# Patient Record
Sex: MALE | Race: WHITE | Employment: FULL TIME | URBAN - METROPOLITAN AREA
[De-identification: names, ages, dates, MRNs, and addresses within clinical notes are randomized per-mention and may not be internally consistent; named-entity substitution may affect disease eponyms.]

---

## 2023-01-18 ENCOUNTER — TELEPHONE (OUTPATIENT)
Dept: PRIMARY CARE CLINIC | Age: 68
End: 2023-01-18

## 2023-01-18 NOTE — TELEPHONE ENCOUNTER
----- Message from Al Brandon sent at 1/17/2023  2:42 PM EST -----  Subject: Message to Provider    QUESTIONS  Information for Provider? Establish care, Pt is requesting new Pt appt   with Dr. Humaira Silva and has been experiencing stomach pain for a   couple weeks. Pt was recently seen by Dr. Frankie Riojas on 1/10/23 and   prefers appts any time except Mondays. Screened green  ---------------------------------------------------------------------------  --------------  Chayito ESQUEDA  [phone]; OK to leave message on voicemail  ---------------------------------------------------------------------------  --------------  SCRIPT ANSWERS  Relationship to Patient? Self  Specialty Confirmation? Primary Care  Do you have pain that has started or worsened within the past 24 hours? No  Are you vomiting blood or have bloody or black stool? No  Have you recently (14 days) seen a provider for this pain?  Yes

## 2023-01-19 ENCOUNTER — OFFICE VISIT (OUTPATIENT)
Dept: FAMILY MEDICINE CLINIC | Age: 68
End: 2023-01-19
Payer: MEDICARE

## 2023-01-19 VITALS
TEMPERATURE: 98.3 F | OXYGEN SATURATION: 97 % | BODY MASS INDEX: 31.4 KG/M2 | HEART RATE: 83 BPM | DIASTOLIC BLOOD PRESSURE: 84 MMHG | HEIGHT: 69 IN | SYSTOLIC BLOOD PRESSURE: 146 MMHG | WEIGHT: 212 LBS

## 2023-01-19 DIAGNOSIS — R10.30 LOWER ABDOMINAL PAIN: Primary | ICD-10-CM

## 2023-01-19 PROCEDURE — 99214 OFFICE O/P EST MOD 30 MIN: CPT | Performed by: PHYSICIAN ASSISTANT

## 2023-01-19 PROCEDURE — 1123F ACP DISCUSS/DSCN MKR DOCD: CPT | Performed by: PHYSICIAN ASSISTANT

## 2023-01-19 RX ORDER — PANTOPRAZOLE SODIUM 40 MG/1
TABLET, DELAYED RELEASE ORAL
COMMUNITY
Start: 2023-01-10

## 2023-01-19 RX ORDER — FEBUXOSTAT 40 MG/1
40 TABLET, FILM COATED ORAL DAILY
COMMUNITY

## 2023-01-19 RX ORDER — DICYCLOMINE HYDROCHLORIDE 10 MG/1
CAPSULE ORAL
COMMUNITY
Start: 2023-01-10

## 2023-01-19 NOTE — PROGRESS NOTES
23  Tiffany Castañeda : 1955 Sex: male  Age 78 y.o. Subjective:  Chief Complaint   Patient presents with    Abdominal Pain     Lower abdominal pain and abnormal bm for the past 3 weeks, taking pantoprazole and dicyclomine from PCP but is not feeling any better         HPI:   Tiffany Castañeda , 78 y.o. male presents to express care for evaluation of lower abdominal pain    HPI  71-year-old male presents to express care for evaluation of lower abdominal pain. The patient's had the symptoms ongoing for about 3 to 4 weeks now. Patient states that he has been seen and evaluated by his PCP. The patient had something similar in the past.  The patient was told that it was related to constipation. The patient is not really having substantial bowel movements. The patient states that he is only having small bowel movements in the morning. The patient is not having any diarrhea. The patient did not have any nausea, vomiting, dysuria, hematuria. He had laboratory studies completed last Wednesday by his PCP that were essentially negative per the patient. They was recommended that he have a CT scan and he is awaiting approval from his insurance company. He actually has a follow-up with gastroenterology tomorrow. ROS:   Unless otherwise stated in this report the patient's positive and negative responses for review of systems for constitutional, eyes, ENT, cardiovascular, respiratory, gastrointestinal, neurological, , musculoskeletal, and integument systems and related systems to the presenting problem are either stated in the history of present illness or were not pertinent or were negative for the symptoms and/or complaints related to the presenting medical problem. Positives and pertinent negatives as per HPI. All others reviewed and are negative.       PMH:     Past Medical History:   Diagnosis Date    Anxiety     GERD (gastroesophageal reflux disease)     Gout     Hyperlipidemia Irritable bowel syndrome        Past Surgical History:   Procedure Laterality Date    TONSILLECTOMY         Family History   Problem Relation Age of Onset    Heart Disease Father         heart valve replaced       Medications:     Current Outpatient Medications:     febuxostat (ULORIC) 40 MG TABS tablet, Take 40 mg by mouth daily, Disp: , Rfl:     dicyclomine (BENTYL) 10 MG capsule, TAKE ONE CAPSULE BY MOUTH THREE TIMES A DAY (Patient not taking: Reported on 1/19/2023), Disp: , Rfl:     pantoprazole (PROTONIX) 40 MG tablet, TAKE ONE TABLET BY MOUTH EVERY DAY, Disp: , Rfl:     HYDROcodone-acetaminophen (NORCO) 5-325 MG per tablet, Take 1 tablet by mouth every 6 hours as needed for Pain. (Patient not taking: Reported on 1/19/2023), Disp: , Rfl:     montelukast (SINGULAIR) 10 MG tablet, , Disp: , Rfl:     Allergies:   No Known Allergies    Social History:     Social History     Tobacco Use    Smoking status: Never    Smokeless tobacco: Never   Substance Use Topics    Alcohol use: No    Drug use: No       Patient lives at home. Physical Exam:     Vitals:    01/19/23 1410   BP: (!) 146/84   Site: Right Upper Arm   Position: Sitting   Pulse: 83   Temp: 98.3 °F (36.8 °C)   TempSrc: Temporal   SpO2: 97%   Weight: 212 lb (96.2 kg)   Height: 5' 9\" (1.753 m)       Exam:  Physical Exam  Nurses note and vital signs reviewed and patient is not hypoxic. General: The patient appears well and in no apparent distress. Patient is resting comfortably on cart. Skin: Warm, dry, no pallor noted. There is no rash noted. Head: Normocephalic, atraumatic. Eye: Normal conjunctiva  Ears, Nose, Mouth, and Throat: Oral mucosa is moist  Cardiovascular: Regular Rate and Rhythm  Respiratory: Patient is in no distress, no accessory muscle use, lungs are clear to auscultation, no wheezing, crackles or rhonchi  Back: Non-tender, no CVA tenderness bilaterally to percussion.   GI: Normal bowel sounds, diffuse lower abdominal tenderness to palpation, no masses appreciated. No rebound, guarding, or rigidity noted. Musculoskeletal: The patient has no evidence of calf tenderness, no pitting edema, symmetrical pulses noted bilaterally  Neurological: A&O x4, normal speech      Testing:           Medical Decision Making:     Vital signs reviewed    Past medical history reviewed. Allergies reviewed. Medications reviewed. Patient on arrival does not appear to be in any apparent distress or discomfort. The patient has been seen and evaluated. The patient does not appear to be toxic or lethargic. The patient will be sent for KUB. We do not have his labs from last Wednesday in the system or in care everywhere. The patient was sent for a KUB that did show evidence of quite a bit of stool noted on the right side of the abdomen. We discussed differential diagnosis. This may be related to constipation. The patient was offered and recommended to have a CT scan. I tried to obtain the CT scan for the patient and he states that he would like to hold off check with his insurance company. He has a follow-up with GI tomorrow. The patient will start taking MiraLAX on a regular basis. Follow-up with GI. The patient is to return to express care or go directly to the emergency department should any of the signs or symptoms worsen. The patient is to followup with primary care physician in 2-3 days for repeat evaluation. The patient has no other questions or concerns at this time the patient will be discharged home. Clinical Impression:   Jacquie Slade was seen today for abdominal pain. Diagnoses and all orders for this visit:    Lower abdominal pain  -     XR ABDOMEN (KUB) (SINGLE AP VIEW); Future      The patient is to call for any concerns or return if any of the signs or symptoms worsen. The patient is to follow-up with PCP in the next 2-3 days for repeat evaluation repeat assessment or go directly to the emergency department. SIGNATURE: Richard Wilkins III, PA-C

## 2023-01-26 ENCOUNTER — OFFICE VISIT (OUTPATIENT)
Dept: FAMILY MEDICINE CLINIC | Age: 68
End: 2023-01-26
Payer: MEDICARE

## 2023-01-26 VITALS
WEIGHT: 212 LBS | SYSTOLIC BLOOD PRESSURE: 130 MMHG | RESPIRATION RATE: 16 BRPM | DIASTOLIC BLOOD PRESSURE: 82 MMHG | HEART RATE: 88 BPM | BODY MASS INDEX: 31.4 KG/M2 | HEIGHT: 69 IN | TEMPERATURE: 97.4 F | OXYGEN SATURATION: 97 %

## 2023-01-26 DIAGNOSIS — K59.00 CONSTIPATION, UNSPECIFIED CONSTIPATION TYPE: ICD-10-CM

## 2023-01-26 DIAGNOSIS — Z00.00 ENCOUNTER FOR MEDICAL EXAMINATION TO ESTABLISH CARE: Primary | ICD-10-CM

## 2023-01-26 PROCEDURE — 99203 OFFICE O/P NEW LOW 30 MIN: CPT | Performed by: FAMILY MEDICINE

## 2023-01-26 PROCEDURE — 1123F ACP DISCUSS/DSCN MKR DOCD: CPT | Performed by: FAMILY MEDICINE

## 2023-01-26 SDOH — ECONOMIC STABILITY: FOOD INSECURITY: WITHIN THE PAST 12 MONTHS, YOU WORRIED THAT YOUR FOOD WOULD RUN OUT BEFORE YOU GOT MONEY TO BUY MORE.: NEVER TRUE

## 2023-01-26 SDOH — ECONOMIC STABILITY: FOOD INSECURITY: WITHIN THE PAST 12 MONTHS, THE FOOD YOU BOUGHT JUST DIDN'T LAST AND YOU DIDN'T HAVE MONEY TO GET MORE.: NEVER TRUE

## 2023-01-26 ASSESSMENT — PATIENT HEALTH QUESTIONNAIRE - PHQ9
SUM OF ALL RESPONSES TO PHQ QUESTIONS 1-9: 0
2. FEELING DOWN, DEPRESSED OR HOPELESS: 0
1. LITTLE INTEREST OR PLEASURE IN DOING THINGS: 0
SUM OF ALL RESPONSES TO PHQ9 QUESTIONS 1 & 2: 0
SUM OF ALL RESPONSES TO PHQ QUESTIONS 1-9: 0

## 2023-01-26 ASSESSMENT — SOCIAL DETERMINANTS OF HEALTH (SDOH): HOW HARD IS IT FOR YOU TO PAY FOR THE VERY BASICS LIKE FOOD, HOUSING, MEDICAL CARE, AND HEATING?: NOT HARD AT ALL

## 2023-01-26 ASSESSMENT — LIFESTYLE VARIABLES
HOW MANY STANDARD DRINKS CONTAINING ALCOHOL DO YOU HAVE ON A TYPICAL DAY: PATIENT DOES NOT DRINK
HOW OFTEN DO YOU HAVE A DRINK CONTAINING ALCOHOL: NEVER

## 2023-01-26 NOTE — PROGRESS NOTES
HPI:  The patient is a 78 y.o. male who presents today to establish care. Previous PCP was Dr. Omaira Grissom at [facility]. Abdominal pain:  Patient is here today with complaints of abdominal pain. This is a/an recent problem. This has been going on for 6 weeks . Exacerbating factors include nothing. Alleviating factors include nothing. Pain is sore and goes into the middle of his back in nature. 5-8/10. Pain is  radiating into his back. Associated signs and symptoms include none. Patient does not have a change in appetite. Patient is  drinking well. Recent travel includes none. Patient does not have blood in stool. Patient has had a colonoscopy with Dr. Kathia Modi at [place] [address] 5 years ago. Patient does not urinary complaints. He states that he has had pain like this in the past.  He states that he was diagnosed with constipation. He states that he had seen [facility]. He went back last week. He states that he was seen by urgent care prior to seeing [address] [place] [address] gastroenterology. He states that he was told to restart amitza which caused diarrhea. He states that he had an x-ray at urgent care. He states that he had a CT scan at [facility] yesterday.         Past Medical History:   Diagnosis Date    Anxiety     GERD (gastroesophageal reflux disease)     Gout     Hyperlipidemia     Irritable bowel syndrome       Past Surgical History:   Procedure Laterality Date    SHOULDER SURGERY Right     shoulder replacment    TONSILLECTOMY        Family History   Problem Relation Age of Onset    Heart Disease Mother     Dementia Mother     Heart Disease Father         heart valve replaced    Kidney Disease Father     No Known Problems Sister     No Known Problems Brother     Bone Cancer Maternal Grandfather      Social History     Socioeconomic History    Marital status:      Spouse name: Not on file    Number of children: Not on file    Years of education: Not on file    Highest education level: Not on file   Occupational History    Not on file   Tobacco Use    Smoking status: Never    Smokeless tobacco: Never   Vaping Use    Vaping Use: Never used   Substance and Sexual Activity    Alcohol use: Yes     Comment: rare    Drug use: No    Sexual activity: Yes     Partners: Female   Other Topics Concern    Not on file   Social History Narrative    Not on file     Social Determinants of Health     Financial Resource Strain: Low Risk     Difficulty of Paying Living Expenses: Not hard at all   Food Insecurity: No Food Insecurity    Worried About Running Out of Food in the Last Year: Never true    Ran Out of Food in the Last Year: Never true   Transportation Needs: Not on file   Physical Activity: Not on file   Stress: Not on file   Social Connections: Not on file   Intimate Partner Violence: Not on file   Housing Stability: Not on file      No Known Allergies     Review of Systems:  Constitutional:  No fever, no fatigue, no chills, no headaches, no weight change  Dermatology:  No rash, no mole, no dry or sensitive skin  ENT:  No cough, no sore throat, no sinus pain, no runny nose, no ear pain  Cardiology:  No chest pain, no palpitations, no leg edema, no shortness of breath, no PND  Endocrinology:  No polydipsia, no polyuria, no cold intolerance, no heat intolerance, no polyphagia, no hair changes  Gastroenterology:  No dysphagia, + abdominal pain, no nausea, no vomiting, + constipation, no diarrhea, no heartburn  Male Reproductive:  No penile discharge, no dysuria  Musculoskeletal:  No joint pain, no leg cramps, no back pain, no muscle aches  Respiratory:  No shortness of breath, no orthopnea, no wheezing, no BEAN, no hemoptysis  Urology:  No blood in the urine, no urinary frequency, no urinary incontinence, no urinary urgency, no nocturia, no dysuria  Neurology:  No numbness/tingling, no dizziness, no weakness  Psychology:  No depression, no sleep disturbances, no suicidal ideation, no anxiety      Vitals:    01/26/23 1427   BP: 130/82   Pulse: 88   Resp: 16   Temp: 97.4 °F (36.3 °C)   TempSrc: Temporal   SpO2: 97%   Weight: 212 lb (96.2 kg)   Height: 5' 9\" (1.753 m)       Physical Exam  Vitals and nursing note reviewed. Constitutional:       Appearance: He is well-developed. HENT:      Head: Normocephalic and atraumatic. Right Ear: External ear normal.      Left Ear: External ear normal.      Nose: Nose normal.   Eyes:      Conjunctiva/sclera: Conjunctivae normal.      Pupils: Pupils are equal, round, and reactive to light. Neck:      Thyroid: No thyromegaly. Cardiovascular:      Rate and Rhythm: Normal rate and regular rhythm. Heart sounds: Normal heart sounds. Pulmonary:      Effort: Pulmonary effort is normal.      Breath sounds: Normal breath sounds. No wheezing. Abdominal:      General: Bowel sounds are decreased. Palpations: Abdomen is soft. Tenderness: There is generalized abdominal tenderness. Musculoskeletal:         General: Normal range of motion. Cervical back: Normal range of motion and neck supple. Skin:     General: Skin is warm and dry. Findings: No rash. Neurological:      Mental Status: He is alert and oriented to person, place, and time. Deep Tendon Reflexes: Reflexes are normal and symmetric. Psychiatric:         Behavior: Behavior normal.       Assessment/Plan:      Reji Garcia was seen today for new patient and abdominal pain. Diagnoses and all orders for this visit:    Encounter for medical examination to establish care    Constipation, unspecified constipation type  Reviewed records from urgent care. Reviewed records from [facility]. Recommend he continue medications as prescribed by [facility]  Will follow up in 2 weeks. If pain continues, will send him to get he cyst evaluated to make sure it is not causing problems. As above.   Call or go to ED immediately if symptoms worsen or persist.  Return in about 2 weeks (around 2/9/2023) for constipation. , or sooner if necessary. Educational materials and/or home exercises printed for patient's review and were included in patient instructions on his/her After Visit Summary and given to patient at the end of visit. Counseled regarding above diagnosis, including possible risks and complications,  especially if left uncontrolled. Counseled regarding the possible side effects, risks, benefits and alternatives to treatment; patient and/or guardian verbalizes understanding, agrees, feels comfortable with and wishes to proceed with above treatment plan. Advised patient to call with any new medication issues, and read all Rx info from pharmacy to assure aware of all possible risks and side effects of medication before taking. Reviewed age and gender appropriate health screening exams and vaccinations. Advised patient regarding importance of keeping up with recommended health maintenance and to schedule as soon as possible if overdue, as this is important in assessing for undiagnosed pathology, especially cancer, as well as protecting against potentially harmful/life threatening disease. Patient and/or guardian verbalizes understanding and agrees with above counseling, assessment and plan. All questions answered. Richard Olvera DO  1/26/2023    I have personally reviewed and updated the chief complaint, HPI, Past Medical, Family and Social History, as well as the above Review of Systems.

## 2023-02-01 ENCOUNTER — TELEPHONE (OUTPATIENT)
Dept: FAMILY MEDICINE CLINIC | Age: 68
End: 2023-02-01

## 2023-02-01 NOTE — TELEPHONE ENCOUNTER
----- Message from Brian Reddy sent at 2/1/2023 11:05 AM EST -----  Subject: Message to Provider    QUESTIONS  Information for Provider? Patient was in last week and saw Jose De Jesus Sotomayor. He was   asked to call back this week to speak to a nurse. He would like a nurse of   the  to give him a call back . ---------------------------------------------------------------------------  --------------  Poncho MCKEON  [phone]; OK to leave message on voicemail  ---------------------------------------------------------------------------  --------------  SCRIPT ANSWERS  Relationship to Patient?  Self

## 2023-02-01 NOTE — TELEPHONE ENCOUNTER
Pt called in stating that he took the amitiza Saturday and and Sunday which did upset his stomach so he stopped taking it. He is wondering if there is something else he should be taking? He is also wondering if you actually saw constipation on the imaging?

## 2023-02-01 NOTE — TELEPHONE ENCOUNTER
He follows with gastroenterology, I would recommend calling their office if having side effects from their medication. And yes it was seen on his imaging.

## 2023-02-06 ENCOUNTER — OFFICE VISIT (OUTPATIENT)
Dept: FAMILY MEDICINE CLINIC | Age: 68
End: 2023-02-06
Payer: MEDICARE

## 2023-02-06 VITALS
HEART RATE: 62 BPM | DIASTOLIC BLOOD PRESSURE: 80 MMHG | SYSTOLIC BLOOD PRESSURE: 128 MMHG | WEIGHT: 214 LBS | HEIGHT: 69 IN | OXYGEN SATURATION: 97 % | TEMPERATURE: 98.4 F | BODY MASS INDEX: 31.7 KG/M2

## 2023-02-06 DIAGNOSIS — K59.00 CONSTIPATION, UNSPECIFIED CONSTIPATION TYPE: Primary | ICD-10-CM

## 2023-02-06 PROCEDURE — 1123F ACP DISCUSS/DSCN MKR DOCD: CPT | Performed by: PHYSICIAN ASSISTANT

## 2023-02-06 PROCEDURE — 99214 OFFICE O/P EST MOD 30 MIN: CPT | Performed by: PHYSICIAN ASSISTANT

## 2023-02-06 RX ORDER — CHLORDIAZEPOXIDE HYDROCHLORIDE AND CLIDINIUM BROMIDE 5; 2.5 MG/1; MG/1
CAPSULE ORAL
COMMUNITY
Start: 2023-01-31

## 2023-02-06 RX ORDER — HYOSCYAMINE SULFATE 0.125 MG
125 TABLET ORAL EVERY 4 HOURS PRN
Qty: 20 TABLET | Refills: 0 | Status: SHIPPED | OUTPATIENT
Start: 2023-02-06

## 2023-02-06 NOTE — PROGRESS NOTES
23  Sheppard Kocher : 1955 Sex: male  Age 78 y.o. Subjective:  Chief Complaint   Patient presents with    Constipation     Was seen 23 by . Had a CAT scan nothing on scan but is still having cramps. Pt wants another x-ray. HPI:   Sheppard Kocher , 78 y.o. male presents to express care for evaluation of constipation    HPI  55-year-old male presents to express care for evaluation of constipation, lower abdominal pain. The patient has had the symptoms ongoing for last couple of months. The patient was seen and evaluated on 2023. The patient had x-ray performed at that time that did show quite a bit of stool. The patient also then had a CT scan. He did not want CT scan at the time of the visit on the . The patient actually followed up with PCP and establish with new PCP on 2023. The patient was treated at the beginning of January with Bentyl and pantoprazole. The patient was then given Librax by PCP. The patient states that he took 1 dose of the medication does not seem to be helping. He notes that he is having some lower crampy abdominal pain and occasional back pain associated. ROS:   Unless otherwise stated in this report the patient's positive and negative responses for review of systems for constitutional, eyes, ENT, cardiovascular, respiratory, gastrointestinal, neurological, , musculoskeletal, and integument systems and related systems to the presenting problem are either stated in the history of present illness or were not pertinent or were negative for the symptoms and/or complaints related to the presenting medical problem. Positives and pertinent negatives as per HPI. All others reviewed and are negative.       PMH:     Past Medical History:   Diagnosis Date    Anxiety     GERD (gastroesophageal reflux disease)     Gout     Hyperlipidemia     Irritable bowel syndrome        Past Surgical History:   Procedure Laterality Date    SHOULDER SURGERY Right     shoulder replacment    TONSILLECTOMY         Family History   Problem Relation Age of Onset    Heart Disease Mother     Dementia Mother     Heart Disease Father         heart valve replaced    Kidney Disease Father     No Known Problems Sister     No Known Problems Brother     Bone Cancer Maternal Grandfather        Medications:     Current Outpatient Medications:     chlordiazePOXIDE-clidinium (LIBRAX) 5-2.5 MG per capsule, TAKE ONE CAPSULE BY MOUTH TWO TIMES A DAY, Disp: , Rfl:     hyoscyamine (LEVSIN) 125 MCG tablet, Take 1 tablet by mouth every 4 hours as needed for Cramping, Disp: 20 tablet, Rfl: 0    febuxostat (ULORIC) 40 MG TABS tablet, Take 40 mg by mouth daily, Disp: , Rfl:     Allergies:   No Known Allergies    Social History:     Social History     Tobacco Use    Smoking status: Never    Smokeless tobacco: Never   Vaping Use    Vaping Use: Never used   Substance Use Topics    Alcohol use: Yes     Comment: rare    Drug use: No       Patient lives at home. Physical Exam:     Vitals:    02/06/23 1410   BP: 128/80   Pulse: 62   Temp: 98.4 °F (36.9 °C)   SpO2: 97%   Weight: 214 lb (97.1 kg)   Height: 5' 9\" (1.753 m)       Exam:  Physical Exam  Nurses note and vital signs reviewed and patient is not hypoxic. General: The patient appears well and in no apparent distress. Patient is resting comfortably on cart. Skin: Warm, dry, no pallor noted. There is no rash noted. Head: Normocephalic, atraumatic. Eye: Normal conjunctiva  Ears, Nose, Mouth, and Throat: Oral mucosa is moist  Cardiovascular: Regular Rate and Rhythm  Respiratory: Patient is in no distress, no accessory muscle use, lungs are clear to auscultation, no wheezing, crackles or rhonchi  Back: Non-tender, no CVA tenderness bilaterally to percussion. GI: Normal bowel sounds, soft, diffuse tenderness to palpation, no masses appreciated. No rebound, guarding, or rigidity noted.   Musculoskeletal: The patient has no evidence of calf tenderness, no pitting edema, symmetrical pulses noted bilaterally  Neurological: A&O x4, normal speech        Testing:     Provider, Dwayne Ghulam Bessa 144 - 01/26/2023    Formatting of this note might be different from the original.  * * *Final Report* * *    DATE OF EXAM: Jan 25 2023 3:25PM     [facility] 0530 - CT ABD/PEL Lucian Steven / ACCESSION # [id number]    PROCEDURE REASON: GASTRITIS    * * * * Physician Interpretation * * * *    RESULT: EXAMINATION: CT ABDOMEN AND PELVIS WITH IV CONTRAST    CLINICAL HISTORY: Right-sided abdominal pain, gastritis. TECHNIQUE: CT of the abdomen and pelvis was performed using standard   technique, scanning from just above the dome of the diaphragm to the   symphysis pubis. MQ: CTAP_3    Contrast:  IV: 100 ml of Omnipaque 300  Oral: 400 ml of Omni 300 10-25ml diluted with water    CT Radiation dose: Integrated Dose-length product (DLP) for this visit =   795.69 mGy*cm. CT Dose Reduction Employed: Automated exposure control (AEC)    COMPARISON: None. RESULT:    Liver: No mass. Biliary Tract: No bile duct dilation. The gallbladder appears   unremarkable. Spleen: No splenomegaly. No mass. Pancreas: No mass or ductal dilatation. Adrenal glands: No mass. Kidneys: No enhancing mass or hydronephrosis. GI Tract: No bowel wall thickening or dilation. Sigmoid diverticulosis,   without CT evidence for acute diverticulitis. A nondilated appendix is   visualized. Lymph nodes: No substantial mesenteric, retroperitoneal, or pelvic   lymphadenopathy. Mesentery: No ascites. No mass. Retroperitoneum: No mass. Vasculature:  - Abdominal aorta and iliac arteries: Atherosclerotic calcifications   without aneurysm. - Celiac and SMA: Patent. - Portal venous system (SMV, splenic vein, portal vein and branches):   Patent.  - Hepatic veins: Patent. Pelvis: No free fluid.  Indeterminate, 7.4 x 6.3 cm cystic lesion is   appreciated within the ventral lower pelvis, causing mass effect upon the   right lateral margin of the urinary bladder. Punctate areas of cyst wall   calcification are noted. Differential considerations include iatrogenic   etiologies, mesenteric/duplication cyst or other benign cystic etiology. Clinical correlation is recommended. Bones/Soft Tissues:  Prominence to the trabecular markings within the proximal left femur may   relate to pagetoid changes. Mild degenerative change within the lumbar   spine. No osseous destructive process. Lower Thorax: Limited images through the lung bases appear essentially   unremarkable. Subsegmental bibasilar atelectasis/scarring is noted.  (topogram) images: No additional findings. IMPRESSION  IMPRESSION:      1. Indeterminate, 7.4 cm cystic lesion within the ventral aspect of the   lower right pelvis, effacing the right lateral margin of the urinary   bladder. Differential considerations include iatrogenic etiologies,   mesentery/duplication cyst or other benign cystic etiology. 2. Sigmoid diverticulosis, without CT evidence for acute diverticulitis   or appendicitis. Transcribe Date/Time: Jan 25 2023 4:18P    Dictated by: Azeem Paez MD    This examination was interpreted and the report reviewed and   electronically signed by:   Azeem Paez MD on Jan 26 2023 9:51AM EST        Thank you for allowing us to participate in the care of your patient. Should there be any questions regarding this interpretation, please call   [phone]. If you are unable to reach us at the number above,  please feel free to contact [facility] at [phone]. Medical Decision Making:     Vital signs reviewed    Past medical history reviewed. Allergies reviewed. Medications reviewed. Patient on arrival does not appear to be in any apparent distress or discomfort. The patient has been seen and evaluated. The patient does not appear to be toxic or lethargic.      The patient had x-rays reviewed and had CT reviewed. There is a indeterminant 7.4 cm cystic lesion within the ventral aspect of the lower right pelvis, effacing the right lateral margin of the urinary bladder. Differential considerations include iatrogenic etiologies, mesentery/duplication cyst or other benign cystic etiology. There is sigmoid diverticulosis without CT evidence of acute diverticulitis or appendicitis. The patient was inquiring about getting another x-ray. Unfortunately we do not have x-ray available today here in the office and the patient will be sent for imaging. I did speak with the pipe [address] and the patient was given a prescription for the Librax and he was also given Bentyl and pantoprazole at the beginning of the month of January. X-ray did not show any evidence of acute obstruction. 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 The patient was updated with the results. The patient is having quite a bit of crampy pain. The patient will continue with the amitizia. The patient will be given Levsin. The patient is to push fluids. Increase the fibers. Close follow-up with PCP. The patient is to return to express care or go directly to the emergency department should any of the signs or symptoms worsen. The patient is to followup with primary care physician in 2-3 days for repeat evaluation. The patient has no other questions or concerns at this time the patient will be discharged home. Clinical Impression:   Miryam Joyner was seen today for constipation. Diagnoses and all orders for this visit:    Constipation, unspecified constipation type  -     XR ABDOMEN (KUB) (SINGLE AP VIEW); Future    Other orders  -     hyoscyamine (LEVSIN) 125 MCG tablet; Take 1 tablet by mouth every 4 hours as needed for Cramping      The patient is to call for any concerns or return if any of the signs or symptoms worsen.  The patient is to follow-up with PCP in the next 2-3 days for repeat evaluation repeat assessment or go directly to the emergency department.      SIGNATURE: Meir Khan III, PA-C

## 2023-02-13 ENCOUNTER — HOSPITAL ENCOUNTER (EMERGENCY)
Age: 68
Discharge: HOME | End: 2023-02-13
Attending: EMERGENCY MEDICINE
Payer: MEDICARE

## 2023-02-13 ENCOUNTER — APPOINTMENT (OUTPATIENT)
Dept: CT IMAGING | Age: 68
End: 2023-02-13
Payer: MEDICARE

## 2023-02-13 VITALS
SYSTOLIC BLOOD PRESSURE: 156 MMHG | OXYGEN SATURATION: 99 % | RESPIRATION RATE: 16 BRPM | WEIGHT: 210 LBS | DIASTOLIC BLOOD PRESSURE: 97 MMHG | TEMPERATURE: 98.6 F | BODY MASS INDEX: 31.1 KG/M2 | HEIGHT: 69 IN | HEART RATE: 75 BPM

## 2023-02-13 DIAGNOSIS — K59.00 CONSTIPATION, UNSPECIFIED CONSTIPATION TYPE: ICD-10-CM

## 2023-02-13 DIAGNOSIS — R19.00 PELVIC MASS IN MALE: Primary | ICD-10-CM

## 2023-02-13 LAB
ALBUMIN SERPL-MCNC: 4.1 G/DL (ref 3.5–5.2)
ALP BLD-CCNC: 86 U/L (ref 40–129)
ALT SERPL-CCNC: 13 U/L (ref 0–40)
ANION GAP SERPL CALCULATED.3IONS-SCNC: 7 MMOL/L (ref 7–16)
AST SERPL-CCNC: 13 U/L (ref 0–39)
BACTERIA: NORMAL /HPF
BASOPHILS ABSOLUTE: 0.04 E9/L (ref 0–0.2)
BASOPHILS RELATIVE PERCENT: 0.6 % (ref 0–2)
BILIRUB SERPL-MCNC: 0.3 MG/DL (ref 0–1.2)
BILIRUBIN URINE: NEGATIVE
BLOOD, URINE: NEGATIVE
BUN BLDV-MCNC: 12 MG/DL (ref 6–23)
CALCIUM SERPL-MCNC: 8.9 MG/DL (ref 8.6–10.2)
CHLORIDE BLD-SCNC: 102 MMOL/L (ref 98–107)
CLARITY: CLEAR
CO2: 29 MMOL/L (ref 22–29)
COLOR: YELLOW
CREAT SERPL-MCNC: 1 MG/DL (ref 0.7–1.2)
EOSINOPHILS ABSOLUTE: 0.05 E9/L (ref 0.05–0.5)
EOSINOPHILS RELATIVE PERCENT: 0.8 % (ref 0–6)
GFR SERPL CREATININE-BSD FRML MDRD: >60 ML/MIN/1.73
GLUCOSE BLD-MCNC: 87 MG/DL (ref 74–99)
GLUCOSE URINE: NEGATIVE MG/DL
HCT VFR BLD CALC: 45.9 % (ref 37–54)
HEMOGLOBIN: 15.8 G/DL (ref 12.5–16.5)
IMMATURE GRANULOCYTES #: 0.01 E9/L
IMMATURE GRANULOCYTES %: 0.2 % (ref 0–5)
KETONES, URINE: NEGATIVE MG/DL
LACTIC ACID, SEPSIS: 1.2 MMOL/L (ref 0.5–1.9)
LEUKOCYTE ESTERASE, URINE: ABNORMAL
LIPASE: 29 U/L (ref 13–60)
LYMPHOCYTES ABSOLUTE: 1.7 E9/L (ref 1.5–4)
LYMPHOCYTES RELATIVE PERCENT: 25.8 % (ref 20–42)
MCH RBC QN AUTO: 30.7 PG (ref 26–35)
MCHC RBC AUTO-ENTMCNC: 34.4 % (ref 32–34.5)
MCV RBC AUTO: 89.3 FL (ref 80–99.9)
MONOCYTES ABSOLUTE: 0.52 E9/L (ref 0.1–0.95)
MONOCYTES RELATIVE PERCENT: 7.9 % (ref 2–12)
NEUTROPHILS ABSOLUTE: 4.27 E9/L (ref 1.8–7.3)
NEUTROPHILS RELATIVE PERCENT: 64.7 % (ref 43–80)
NITRITE, URINE: NEGATIVE
PDW BLD-RTO: 12.6 FL (ref 11.5–15)
PH UA: 8 (ref 5–9)
PLATELET # BLD: 248 E9/L (ref 130–450)
PMV BLD AUTO: 9.7 FL (ref 7–12)
POTASSIUM SERPL-SCNC: 4.1 MMOL/L (ref 3.5–5)
PROTEIN UA: NEGATIVE MG/DL
RBC # BLD: 5.14 E12/L (ref 3.8–5.8)
RBC UA: NORMAL /HPF (ref 0–2)
SODIUM BLD-SCNC: 138 MMOL/L (ref 132–146)
SPECIFIC GRAVITY UA: 1.01 (ref 1–1.03)
TOTAL PROTEIN: 6.7 G/DL (ref 6.4–8.3)
UROBILINOGEN, URINE: 0.2 E.U./DL
WBC # BLD: 6.6 E9/L (ref 4.5–11.5)
WBC UA: NORMAL /HPF (ref 0–5)

## 2023-02-13 PROCEDURE — 81001 URINALYSIS AUTO W/SCOPE: CPT

## 2023-02-13 PROCEDURE — 6360000004 HC RX CONTRAST MEDICATION: Performed by: RADIOLOGY

## 2023-02-13 PROCEDURE — 36415 COLL VENOUS BLD VENIPUNCTURE: CPT

## 2023-02-13 PROCEDURE — 74177 CT ABD & PELVIS W/CONTRAST: CPT

## 2023-02-13 PROCEDURE — 83690 ASSAY OF LIPASE: CPT

## 2023-02-13 PROCEDURE — 83605 ASSAY OF LACTIC ACID: CPT

## 2023-02-13 PROCEDURE — 80053 COMPREHEN METABOLIC PANEL: CPT

## 2023-02-13 PROCEDURE — 85025 COMPLETE CBC W/AUTO DIFF WBC: CPT

## 2023-02-13 PROCEDURE — 99285 EMERGENCY DEPT VISIT HI MDM: CPT

## 2023-02-13 RX ORDER — DICYCLOMINE HYDROCHLORIDE 10 MG/1
10 CAPSULE ORAL 4 TIMES DAILY
Qty: 12 CAPSULE | Refills: 0 | Status: SHIPPED | OUTPATIENT
Start: 2023-02-13 | End: 2023-02-16

## 2023-02-13 RX ADMIN — IOPAMIDOL 75 ML: 755 INJECTION, SOLUTION INTRAVENOUS at 17:00

## 2023-02-13 ASSESSMENT — PAIN SCALES - GENERAL: PAINLEVEL_OUTOF10: 8

## 2023-02-13 ASSESSMENT — PAIN - FUNCTIONAL ASSESSMENT: PAIN_FUNCTIONAL_ASSESSMENT: 0-10

## 2023-02-13 ASSESSMENT — PAIN DESCRIPTION - LOCATION: LOCATION: ABDOMEN

## 2023-02-13 NOTE — ED PROVIDER NOTES
[address]        Pt Name: Estephanie Jalloh  MRN: [id number]  [place] 1955  Date of evaluation: 2/13/2023  Provider: Yoly Dwyer MD  PCP: Elver Connor DO  Note Started: 3:31 PM EST 2/13/23    CHIEF COMPLAINT       Chief Complaint   Patient presents with    Flank Pain     Right flank pain    Abdominal Pain     RLQ pain since December. Seen at urgent cares and PCP for same       HISTORY OF PRESENT ILLNESS: 1 or more Elements   History From: Patient    Limitations to history : None    Estephanie Jalloh is a 78 y.o. male who presents with 2 months daily intermittent cramping pain to the lower abdomen diffusely. He states occasionally has pain to the right flank but mostly just diffuse cramping pain. He had a CT scan that showed constipation is a x-ray that showed a possible stone. He has follow-up scheduled this week with urology, Dr. Joaquin Richmond, is scheduled for an outpatient cystoscopy. He seen GI Dr. Gucci Jeff and. They are recommending an outpatient colonoscopy. He has been started on stool softeners and he is having bowel movements his last bowel movement was this morning. He is no abdominal pain at this time. Just frustrated he does not have a \"true diagnosis\". He is not any fevers or chills and no nausea or vomiting. Denies any blood in his stool. Denies any gross hematuria. Denies any numbness or weakness to the extremities no radicular symptoms. No chest pain or shortness of breath. No change in symptoms today. Does have documented history of anxiety as well as IBS. Nursing Notes were all reviewed and agreed with or any disagreements were addressed in the HPI. REVIEW OF EXTERNAL NOTE :       I reviewed outpatient family medicine encounter with Dr. Bereket Small on 1/26/2023 with same complaints of abdominal pain. Recommended outpatient follow-up with [facility] gastroenterology.     REVIEW OF SYSTEMS :           Positives and Pertinent negatives as per HPI. SURGICAL HISTORY     Past Surgical History:   Procedure Laterality Date    SHOULDER SURGERY Right     shoulder replacment    TONSILLECTOMY         CURRENTMEDICATIONS       Previous Medications    FEBUXOSTAT (ULORIC) 40 MG TABS TABLET    Take 40 mg by mouth daily       ALLERGIES     Patient has no known allergies. FAMILYHISTORY       Family History   Problem Relation Age of Onset    Heart Disease Mother     Dementia Mother     Heart Disease Father         heart valve replaced    Kidney Disease Father     No Known Problems Sister     No Known Problems Brother     Bone Cancer Maternal Grandfather         SOCIAL HISTORY       Social History     Tobacco Use    Smoking status: Never    Smokeless tobacco: Never   Vaping Use    Vaping Use: Never used   Substance Use Topics    Alcohol use: Yes     Comment: rare    Drug use: No       SCREENINGS        Sam Coma Scale  Eye Opening: Spontaneous  Best Verbal Response: Oriented  Best Motor Response: Obeys commands  Sam Coma Scale Score: 15                CIWA Assessment  BP: (!) 156/97  Heart Rate: 75           PHYSICAL EXAM  1 or more Elements     ED Triage Vitals [02/13/23 1456]   BP Temp Temp src Heart Rate Resp SpO2 Height Weight   (!) 189/120 98.6 °F (37 °C) -- 70 14 98 % 5' 9\" (1.753 m) 210 lb (95.3 kg)                Constitutional/General: Alert and oriented x3; overweight  Head: Normocephalic and atraumatic  Eyes: PERRL, EOMI, conjunctiva normal, sclera non icteric  ENT:  Oropharynx clear, handling secretions, no trismus, no asymmetry of the posterior oropharynx or uvular edema  Neck: Supple, full ROM, no stridor, no meningeal signs  Respiratory: Lungs clear to auscultation bilaterally, no wheezes, rales, or rhonchi. Not in respiratory distress  Cardiovascular:  Regular rate. Regular rhythm. No murmurs, no gallops, no rubs. 2+ distal pulses. Equal extremity pulses.    Chest: No chest wall tenderness  GI:  Abdomen Soft, Non tender, Non distended.  No rebound, guarding, or rigidity. No pulsatile masses.  No Edwards sign.  No McBurney's point tenderness no CVA tenderness bilaterally  Musculoskeletal: Moves all extremities x 4. Warm and well perfused, no clubbing, no cyanosis, no edema. Capillary refill <3 seconds  Integument: skin warm and dry. No rashes.   Neurologic: GCS 15, no focal deficits, symmetric strength 5/5 in the upper and lower extremities bilaterally  Psychiatric: Normal Affect            DIAGNOSTIC RESULTS   LABS:      I have personally reviewed and interpreted all laboratory and imaging results for this patient. Results are listed below.     LABS:  Results for orders placed or performed during the hospital encounter of 02/13/23   Comprehensive Metabolic Panel   Result Value Ref Range    Sodium 138 132 - 146 mmol/L    Potassium 4.1 3.5 - 5.0 mmol/L    Chloride 102 98 - 107 mmol/L    CO2 29 22 - 29 mmol/L    Anion Gap 7 7 - 16 mmol/L    Glucose 87 74 - 99 mg/dL    BUN 12 6 - 23 mg/dL    Creatinine 1.0 0.7 - 1.2 mg/dL    Est, Glom Filt Rate >60 >=60 mL/min/1.73    Calcium 8.9 8.6 - 10.2 mg/dL    Total Protein 6.7 6.4 - 8.3 g/dL    Albumin 4.1 3.5 - 5.2 g/dL    Total Bilirubin 0.3 0.0 - 1.2 mg/dL    Alkaline Phosphatase 86 40 - 129 U/L    ALT 13 0 - 40 U/L    AST 13 0 - 39 U/L   CBC with Auto Differential   Result Value Ref Range    WBC 6.6 4.5 - 11.5 E9/L    RBC 5.14 3.80 - 5.80 E12/L    Hemoglobin 15.8 12.5 - 16.5 g/dL    Hematocrit 45.9 37.0 - 54.0 %    MCV 89.3 80.0 - 99.9 fL    MCH 30.7 26.0 - 35.0 pg    MCHC 34.4 32.0 - 34.5 %    RDW 12.6 11.5 - 15.0 fL    Platelets 248 130 - 450 E9/L    MPV 9.7 7.0 - 12.0 fL    Neutrophils % 64.7 43.0 - 80.0 %    Immature Granulocytes % 0.2 0.0 - 5.0 %    Lymphocytes % 25.8 20.0 - 42.0 %    Monocytes % 7.9 2.0 - 12.0 %    Eosinophils % 0.8 0.0 - 6.0 %    Basophils % 0.6 0.0 - 2.0 %    Neutrophils Absolute 4.27 1.80 - 7.30 E9/L    Immature Granulocytes  # 0.01 E9/L    Lymphocytes Absolute 1.70 1.50 - 4.00 E9/L    Monocytes Absolute 0.52 0.10 - 0.95 E9/L    Eosinophils Absolute 0.05 0.05 - 0.50 E9/L    Basophils Absolute 0.04 0.00 - 0.20 E9/L   Lipase   Result Value Ref Range    Lipase 29 13 - 60 U/L   Urinalysis   Result Value Ref Range    Color, UA Yellow Straw/Yellow    Clarity, UA Clear Clear    Glucose, Ur Negative Negative mg/dL    Bilirubin Urine Negative Negative    Ketones, Urine Negative Negative mg/dL    Specific Gravity, UA 1.015 1.005 - 1.030    Blood, Urine Negative Negative    pH, UA 8.0 5.0 - 9.0    Protein, UA Negative Negative mg/dL    Urobilinogen, Urine 0.2 <2.0 E.U./dL    Nitrite, Urine Negative Negative    Leukocyte Esterase, Urine TRACE (A) Negative   Lactate, Sepsis   Result Value Ref Range    Lactic Acid, Sepsis 1.2 0.5 - 1.9 mmol/L   Microscopic Urinalysis   Result Value Ref Range    WBC, UA NONE 0 - 5 /HPF    RBC, UA NONE 0 - 2 /HPF    Bacteria, UA NONE SEEN None Seen /HPF             RADIOLOGY:   Non-plain film images such as CT, Ultrasound and MRI are read by the radiologist. Plain radiographic images are visualized and preliminarily interpreted by the ED Provider with the below findings:        Interpretation per the Radiologist below, if available at the time of this note:    CT ABDOMEN PELVIS W IV CONTRAST Additional Contrast? None   Final Result   6.9 x 6.0 cm cystic mass with punctate peripheral calcification in the right   pelvis mild to rectally abutting against the right side of the urinary   bladder. Differential diagnostic considerations include large bladder   diverticulum or other cystic pelvic masses. Correlation with bladder   sonogram recommended. Diverticulosis with no evidence of diverticulitis. Mild constipation. No results found. No results found.     PROCEDURES   Unless otherwise noted below, none          CRITICAL CARE TIME (.cct)   none    PAST MEDICAL HISTORY/Chronic Conditions Affecting Care      has a past medical history of Anxiety, GERD (gastroesophageal reflux disease), Gout, Hyperlipidemia, and Irritable bowel syndrome. EMERGENCY DEPARTMENT COURSE    Vitals:    Vitals:    02/13/23 1456 02/13/23 1733   BP: (!) 189/120 (!) 156/97   Pulse: 70 75   Resp: 14 16   Temp: 98.6 °F (37 °C)    SpO2: 98% 99%   Weight: 210 lb (95.3 kg)    Height: 5' 9\" (1.753 m)        Patient was given the following medications:  Medications   iopamidol (ISOVUE-370) 76 % injection 75 mL (75 mLs IntraVENous Given 2/13/23 1700)           Is this patient to be included in the SEP-1 Core Measure due to severe sepsis or septic shock? No Exclusion criteria - the patient is NOT to be included for SEP-1 Core Measure due to: Infection is not suspected        Medical Decision Making/Differential Diagnosis:    CC/HPI Summary, Social Determinants of health, Records Reviewed, DDx, testing done/not done, ED Course, Reassessment, disposition considerations/shared decision making with patient, consults, disposition:          Medical Decision Making  Amount and/or Complexity of Data Reviewed  Labs: ordered. Radiology: ordered. Risk  Prescription drug management. Ira Page is a 78 y.o. male who presents to the ED for daily cramping diffuse lower abdominal pain for the last 2 months. He has seen his outpatient PCP he has seen GI. They recommended colonoscopy he has an outpatient follow-up with urology and they are recommending cystoscopy. He is started new medications for constipation which he has had issues with the past with a history of IBS. No new changes today actually no abdominal pain at all in the emergency room today no urinary complaints no fever no chills last bowel was this morning. No blood in his stool. She will have lab work and repeat CT imaging as last CT was 1 month ago per patient.   Vital signs upon arrival BP (!) 156/97   Pulse 75   Temp 98.6 °F (37 °C)   Resp 16   Ht 5' 9\" (1.753 m) Wt 210 lb (95.3 kg)   SpO2 99%   BMI 31.01 kg/m²       Chronic conditions:  has a past medical history of Anxiety, GERD (gastroesophageal reflux disease), Gout, Hyperlipidemia, and Irritable bowel syndrome. Differential diagnosis includes but not limited to constipation IBS kidney stone dehydration UTI          ED Course Summary:(labs and imaging reviewed, interventions, reassessment, consults,shared decision making with patient, disposition)    No abdominal pain on initial exam.  Did not want any pain medication. CBC was normal white count was 6.6 hemoglobin stable at 15.8 chemistry was normal creatinine was normal no signs of dehydration. LFTs were normal, as well lipase was normal urinalysis was negative for acute infection no blood. Lactic acid was normal.  CT abdomen pelvis did not show any sign of stone. No diverticulitis. There is a 6.9 x 6 cm cystic mass on the right side of the pelvis differential includes diverticular cystic pelvic mass. I did review prior outpatient imaging that confirms similar mass. He is aware of this finding and need for close follow-up with urology. He excised follow-up with appointment this week. Patient does have mild constipation. Patient be given Bentyl for intermittent abdominal cramping he was advised to start taking MiraLAX, as well. Repeat abdominal exam was soft and nontender. No acute pathology found. He has follow-up outpatient with PCP as well as GI as well as urology. We discussed the need for close follow-up and to return for any worsening symptoms he is comfortable this plan and stable for discharge. ED Course as of 02/13/23 1836 Mon Feb 13, 2023   7428 Reviewed prior CT abdomen and pelvis result from 2/26/2016 that showed a bladder diverticulum on that CT. [KK]   1829 I reviewed CT from [place] [facility] from 1/25/2023 that showed a 7.4 cm right-sided lower pelvic mass possibly diverticulum versus benign cystic lesion.   This is similar to what is seen on CT today. [OQ]   5334 States is currently taking stool softeners for constipation but occasionally has abdominal cramping related to this. I will start the patient on Bentyl. He has outpatient follow-up with urology as well as GI scheduled for this coming week. Jeffrey abdominal exam soft and nontender before discharge. [OG]   8485 Patient is well aware of abnormal CT findings and known pelvic cystic mass. He has follow-up with urology regarding that this week. [KK]   1830 Patient was not taking Levsin at home he states it did not work for him he wants to try the Bentyl. He will follow-up with the GI doctor, as well Preston King      ED Course User Index  [KK] Bereket York MD          CONSULTS: (Who and What was discussed)  None        I am the Primary Clinician of Record. FINAL IMPRESSION      1. Pelvic mass in male Stable   2.  Constipation, unspecified constipation type          DISPOSITION/PLAN     DISPOSITION Decision To Discharge 02/13/2023 06:30:04 PM      PATIENT REFERRED TO:  Corie Vanegas DO  [address] [facility],[phone]    Call in 2 days  for follow-up appointment    Alf Frias MD  [address] [phone]    Call in 3 days  for follow-up appointment    [address] [place] [address] [phone]    Call in 3 days  for follow-up appointment    DISCHARGE MEDICATIONS:  New Prescriptions    DICYCLOMINE (BENTYL) 10 MG CAPSULE    Take 1 capsule by mouth 4 times daily for 3 days       DISCONTINUED MEDICATIONS:  Discontinued Medications    CHLORDIAZEPOXIDE-CLIDINIUM (LIBRAX) 5-2.5 MG PER CAPSULE    TAKE ONE CAPSULE BY MOUTH TWO TIMES A DAY    HYOSCYAMINE (LEVSIN) 125 MCG TABLET    Take 1 tablet by mouth every 4 hours as needed for Cramping              (Please note that portions of this note were completed with a voice recognition program.  Efforts were made to edit the dictations but occasionally words are mis-transcribed.)    Any Dwyer MD (electronically signed)            Chan Mays MD  02/15/23 7192

## 2023-02-24 ENCOUNTER — OFFICE VISIT (OUTPATIENT)
Dept: PRIMARY CARE CLINIC | Age: 68
End: 2023-02-24
Payer: MEDICARE

## 2023-02-24 VITALS
TEMPERATURE: 97.4 F | HEART RATE: 84 BPM | OXYGEN SATURATION: 98 % | SYSTOLIC BLOOD PRESSURE: 140 MMHG | WEIGHT: 214 LBS | HEIGHT: 69 IN | DIASTOLIC BLOOD PRESSURE: 88 MMHG | BODY MASS INDEX: 31.7 KG/M2

## 2023-02-24 DIAGNOSIS — I10 PRIMARY HYPERTENSION: ICD-10-CM

## 2023-02-24 DIAGNOSIS — F41.9 ANXIETY: ICD-10-CM

## 2023-02-24 DIAGNOSIS — R10.30 LOWER ABDOMINAL PAIN: Primary | ICD-10-CM

## 2023-02-24 PROCEDURE — 99203 OFFICE O/P NEW LOW 30 MIN: CPT | Performed by: FAMILY MEDICINE

## 2023-02-24 PROCEDURE — 3079F DIAST BP 80-89 MM HG: CPT | Performed by: FAMILY MEDICINE

## 2023-02-24 PROCEDURE — 3077F SYST BP >= 140 MM HG: CPT | Performed by: FAMILY MEDICINE

## 2023-02-24 PROCEDURE — 1123F ACP DISCUSS/DSCN MKR DOCD: CPT | Performed by: FAMILY MEDICINE

## 2023-02-24 RX ORDER — TIZANIDINE 4 MG/1
4 TABLET ORAL 3 TIMES DAILY
Qty: 90 TABLET | Refills: 5 | Status: SHIPPED | OUTPATIENT
Start: 2023-02-24

## 2023-02-24 RX ORDER — LISINOPRIL 10 MG/1
10 TABLET ORAL DAILY
Qty: 30 TABLET | Refills: 5 | Status: SHIPPED | OUTPATIENT
Start: 2023-02-24

## 2023-02-24 SDOH — ECONOMIC STABILITY: INCOME INSECURITY: HOW HARD IS IT FOR YOU TO PAY FOR THE VERY BASICS LIKE FOOD, HOUSING, MEDICAL CARE, AND HEATING?: NOT HARD AT ALL

## 2023-02-24 SDOH — ECONOMIC STABILITY: FOOD INSECURITY: WITHIN THE PAST 12 MONTHS, YOU WORRIED THAT YOUR FOOD WOULD RUN OUT BEFORE YOU GOT MONEY TO BUY MORE.: NEVER TRUE

## 2023-02-24 SDOH — ECONOMIC STABILITY: HOUSING INSECURITY
IN THE LAST 12 MONTHS, WAS THERE A TIME WHEN YOU DID NOT HAVE A STEADY PLACE TO SLEEP OR SLEPT IN A SHELTER (INCLUDING NOW)?: NO

## 2023-02-24 SDOH — ECONOMIC STABILITY: FOOD INSECURITY: WITHIN THE PAST 12 MONTHS, THE FOOD YOU BOUGHT JUST DIDN'T LAST AND YOU DIDN'T HAVE MONEY TO GET MORE.: NEVER TRUE

## 2023-02-24 ASSESSMENT — ENCOUNTER SYMPTOMS
BLOOD IN STOOL: 0
CONSTIPATION: 1
SORE THROAT: 0
DIARRHEA: 0
ABDOMINAL DISTENTION: 1
BACK PAIN: 1
PHOTOPHOBIA: 0
ABDOMINAL PAIN: 1
SHORTNESS OF BREATH: 0
COUGH: 0
VOMITING: 0
NAUSEA: 0

## 2023-02-24 NOTE — PROGRESS NOTES
Misti Levine (:  1955) is a 78 y.o. male,New patient, here for evaluation of the following chief complaint(s):  New Patient and Establish Care         ASSESSMENT/PLAN:  1. Lower abdominal pain  -     tiZANidine (ZANAFLEX) 4 MG tablet; Take 1 tablet by mouth 3 times daily, Disp-90 tablet, R-5Normal  2. Primary hypertension  -     lisinopril (PRINIVIL;ZESTRIL) 10 MG tablet; Take 1 tablet by mouth daily, Disp-30 tablet, R-5Normal  3. Anxiety  Patient has a follow-up appointment with general surgery for evaluation of the longstanding abdominal cyst.  Further evaluation and treatment will be based on their recommendations. Images reviewed today. Zanaflex given to help with the patient's current pain as he has been on multiple medications without any significant change in symptoms. Has followed up with urology as well. Return in about 4 weeks (around 3/24/2023). Subjective   SUBJECTIVE/OBJECTIVE:  HPI  Patient presents today to establish with new PCP. Patient has been to see Gely Childers/Javier/Piedad in the recent past.  Current issues with lower abdominal pain and constipation. Has been through [facility] care multiple times for the same. Has had multiple CT scans as well. Has been on Bentyl and Levsin with minimal improvement. Has also seen multiple gastroenterologist.  He did see Dr. Scott Rodriguez who put him on Amitiza which caused side effects. Patient had also seen Dr Alison Yeboah at [facility] 5 years ago. Review of CAT scan shows persistent right-sided cystic lesion against the bladder since 2016. He was advised to get an MRI after the CAT scan in 2016 however this was not performed. He did see urology who did cystoscopy and showed that the cyst does not communicate with the bladder at this time. He was subsequently referred to general surgery and has not seen them as of yet. Continues to complain of lower back pain radiating to the abdomen in addition to worsening constipation.   Has been on MiraLAX with some relief. No other issues or concerns today. Of note patient has had elevated blood pressures over the last several office visits. Refuses medication at this time. Review of Systems   Constitutional:  Negative for chills and fever. HENT:  Negative for congestion, hearing loss, nosebleeds and sore throat. Eyes:  Negative for photophobia. Respiratory:  Negative for cough and shortness of breath. Cardiovascular:  Negative for chest pain, palpitations and leg swelling. Gastrointestinal:  Positive for abdominal distention, abdominal pain and constipation. Negative for blood in stool, diarrhea, nausea and vomiting. Endocrine: Negative for polydipsia. Genitourinary:  Negative for dysuria, frequency, hematuria and urgency. Musculoskeletal:  Positive for back pain. Negative for myalgias. Skin: Negative. Neurological:  Negative for dizziness, tremors, weakness and headaches. Hematological:  Does not bruise/bleed easily. Psychiatric/Behavioral:  Negative for hallucinations and suicidal ideas. All other systems reviewed and are negative.        Current Outpatient Medications:     tiZANidine (ZANAFLEX) 4 MG tablet, Take 1 tablet by mouth 3 times daily, Disp: 90 tablet, Rfl: 5    lisinopril (PRINIVIL;ZESTRIL) 10 MG tablet, Take 1 tablet by mouth daily, Disp: 30 tablet, Rfl: 5    febuxostat (ULORIC) 40 MG TABS tablet, Take 40 mg by mouth daily, Disp: , Rfl:     dicyclomine (BENTYL) 10 MG capsule, Take 1 capsule by mouth 4 times daily for 3 days, Disp: 12 capsule, Rfl: 0   Patient Active Problem List   Diagnosis    Lower abdominal pain    Acute gout of left foot    Primary hypertension    Anxiety     Past Medical History:   Diagnosis Date    Anxiety     GERD (gastroesophageal reflux disease)     Gout     Hyperlipidemia     Irritable bowel syndrome      Past Surgical History:   Procedure Laterality Date    SHOULDER SURGERY Right     shoulder replacment    TONSILLECTOMY Social History     Socioeconomic History    Marital status:      Spouse name: Not on file    Number of children: Not on file    Years of education: Not on file    Highest education level: Not on file   Occupational History    Not on file   Tobacco Use    Smoking status: Never    Smokeless tobacco: Never   Vaping Use    Vaping Use: Never used   Substance and Sexual Activity    Alcohol use: Yes     Comment: rare    Drug use: No    Sexual activity: Yes     Partners: Female   Other Topics Concern    Not on file   Social History Narrative    Not on file     Social Determinants of Health     Financial Resource Strain: Low Risk     Difficulty of Paying Living Expenses: Not hard at all   Food Insecurity: No Food Insecurity    Worried About [address] in the Last Year: Never true    [address] [employer] in the Last Year: Never true   Transportation Needs: Unknown    Lack of Transportation (Medical): Not on file    Lack of Transportation (Non-Medical): No   Physical Activity: Not on file   Stress: Not on file   Social Connections: Not on file   Intimate Partner Violence: Not on file   Housing Stability: Unknown    Unable to Pay for Housing in the Last Year: Not on file    Number of Places Lived in the Last Year: Not on file    Unstable Housing in the Last Year: No     Family History   Problem Relation Age of Onset    Heart Disease Mother     Dementia Mother     Heart Disease Father         heart valve replaced    Kidney Disease Father     No Known Problems Sister     No Known Problems Brother     Bone Cancer Maternal Grandfather       There are no preventive care reminders to display for this patient. There are no preventive care reminders to display for this patient.    Diabetes Management   Topic Date Due    Lipids  03/04/2021      Health Maintenance Due   Topic    DTaP/Tdap/Td vaccine (1 - Tdap)    Shingles vaccine (1 of 2)    Pneumococcal 65+ years Vaccine (1 - PCV)      Health Maintenance   Topic Date Due    COVID-19 Vaccine (1) Never done    Hepatitis C screen  Never done    DTaP/Tdap/Td vaccine (1 - Tdap) Never done    Shingles vaccine (1 of 2) Never done    Lipids  03/04/2021    Pneumococcal 65+ years Vaccine (1 - PCV) Never done    Flu vaccine (1) Never done    Depression Screen  01/26/2024    Colorectal Cancer Screen  04/12/2026    Hepatitis A vaccine  Aged Out    Hib vaccine  Aged Out    Meningococcal (ACWY) vaccine  Aged Out      There are no preventive care reminders to display for this patient.   There are no preventive care reminders to display for this patient.     BP (!) 140/88 (Site: Right Upper Arm)   Pulse 84   Temp 97.4 °F (36.3 °C)   Ht 5' 9\" (1.753 m)   Wt 214 lb (97.1 kg)   SpO2 98%   BMI 31.60 kg/m²     Objective   Physical Exam  Vitals reviewed.   HENT:      Head: Normocephalic and atraumatic.   Eyes:      General: No scleral icterus.     Extraocular Movements: Extraocular movements intact.      Conjunctiva/sclera: Conjunctivae normal.      Pupils: Pupils are equal, round, and reactive to light.   Neck:      Thyroid: No thyromegaly.   Cardiovascular:      Rate and Rhythm: Normal rate and regular rhythm.      Heart sounds: Normal heart sounds. No murmur heard.  Pulmonary:      Effort: Pulmonary effort is normal.      Breath sounds: Normal breath sounds. No rales.   Abdominal:      General: Bowel sounds are normal. There is no distension.      Palpations: Abdomen is soft.      Tenderness: There is abdominal tenderness.   Musculoskeletal:         General: Tenderness present.      Cervical back: Neck supple.      Right lower leg: No edema.      Left lower leg: No edema.   Lymphadenopathy:      Cervical: No cervical adenopathy.   Skin:     General: Skin is warm and dry.      Findings: No erythema or rash.   Neurological:      Mental Status: He is alert and oriented to person, place, and time.      Cranial Nerves: No cranial nerve deficit.   Psychiatric:         Judgment: Judgment normal.       An electronic signature was used to authenticate this note.     --Rosalina Lewis, DO

## 2023-03-07 ENCOUNTER — TELEPHONE (OUTPATIENT)
Dept: PRIMARY CARE CLINIC | Age: 68
End: 2023-03-07

## 2023-03-07 NOTE — TELEPHONE ENCOUNTER
----- Message from Noemy Jenkins sent at 3/7/2023 11:45 AM EST -----  Subject: Appointment Request    Reason for Call: New Patient/New to Provider Appointment needed: New   Patient Request Appointment    QUESTIONS    Reason for appointment request? No appointments available during search     Additional Information for Provider?  Pt was referred to Dr. Friedman Sessions from   dr. Mauricio Haynes would like to establish as a new pt   ---------------------------------------------------------------------------  --------------  [id number] [employer]  [phone]; OK to leave message on voicemail  ---------------------------------------------------------------------------  --------------  SCRIPT ANSWERS  COVID Screen: [facility]

## 2023-03-23 ENCOUNTER — OFFICE VISIT (OUTPATIENT)
Dept: PRIMARY CARE CLINIC | Age: 68
End: 2023-03-23

## 2023-03-23 VITALS
BODY MASS INDEX: 31.4 KG/M2 | OXYGEN SATURATION: 97 % | TEMPERATURE: 97.5 F | HEART RATE: 72 BPM | HEIGHT: 69 IN | DIASTOLIC BLOOD PRESSURE: 94 MMHG | WEIGHT: 212 LBS | SYSTOLIC BLOOD PRESSURE: 162 MMHG

## 2023-03-23 DIAGNOSIS — I10 PRIMARY HYPERTENSION: ICD-10-CM

## 2023-03-23 DIAGNOSIS — R10.84 GENERALIZED ABDOMINAL PAIN: ICD-10-CM

## 2023-03-23 DIAGNOSIS — Z91.199 NONCOMPLIANCE BY REFUSING INTERVENTION OR SUPPORT: Primary | ICD-10-CM

## 2023-03-23 DIAGNOSIS — F45.21 HYPOCHONDRIA: ICD-10-CM

## 2023-03-23 LAB — CRP SERPL HS-MCNC: <0.3 MG/DL (ref 0–0.4)

## 2023-03-23 RX ORDER — METOCLOPRAMIDE 5 MG/1
5 TABLET ORAL 3 TIMES DAILY
Qty: 120 TABLET | Refills: 3 | Status: SHIPPED | OUTPATIENT
Start: 2023-03-23

## 2023-03-23 RX ORDER — METRONIDAZOLE 500 MG/1
500 TABLET ORAL 3 TIMES DAILY
COMMUNITY
Start: 2023-03-21

## 2023-03-23 ASSESSMENT — ENCOUNTER SYMPTOMS
BACK PAIN: 1
SORE THROAT: 0
CONSTIPATION: 0
DIARRHEA: 0
BLOOD IN STOOL: 0
PHOTOPHOBIA: 0
ABDOMINAL DISTENTION: 1
COUGH: 0
VOMITING: 0
ABDOMINAL PAIN: 1
NAUSEA: 0
SHORTNESS OF BREATH: 0

## 2023-03-23 ASSESSMENT — PATIENT HEALTH QUESTIONNAIRE - PHQ9
SUM OF ALL RESPONSES TO PHQ9 QUESTIONS 1 & 2: 0
SUM OF ALL RESPONSES TO PHQ QUESTIONS 1-9: 0
2. FEELING DOWN, DEPRESSED OR HOPELESS: 0
1. LITTLE INTEREST OR PLEASURE IN DOING THINGS: 0
SUM OF ALL RESPONSES TO PHQ QUESTIONS 1-9: 0

## 2023-03-23 NOTE — PROGRESS NOTES
daily (Patient not taking: Reported on 3/23/2023), Disp: 30 tablet, Rfl: 5    dicyclomine (BENTYL) 10 MG capsule, Take 1 capsule by mouth 4 times daily for 3 days, Disp: 12 capsule, Rfl: 0   Patient Active Problem List   Diagnosis    Lower abdominal pain    Acute gout of left foot    Primary hypertension    Anxiety    Noncompliance by refusing intervention or support    Hypochondria    Generalized abdominal pain     Past Medical History:   Diagnosis Date    Anxiety     GERD (gastroesophageal reflux disease)     Gout     Hyperlipidemia     Irritable bowel syndrome      Past Surgical History:   Procedure Laterality Date    SHOULDER SURGERY Right     shoulder replacment    TONSILLECTOMY       Social History     Socioeconomic History    Marital status:      Spouse name: Not on file    Number of children: Not on file    Years of education: Not on file    Highest education level: Not on file   Occupational History    Not on file   Tobacco Use    Smoking status: Never    Smokeless tobacco: Never   Vaping Use    Vaping Use: Never used   Substance and Sexual Activity    Alcohol use: Yes     Comment: rare    Drug use: No    Sexual activity: Yes     Partners: Female   Other Topics Concern    Not on file   Social History Narrative    Not on file     Social Determinants of Health     Financial Resource Strain: Low Risk     Difficulty of Paying Living Expenses: Not hard at all   Food Insecurity: No Food Insecurity    Worried About Running Out of Food in the Last Year: Never true    [address] in the Last Year: Never true   Transportation Needs: Unknown    Lack of Transportation (Medical): Not on file    Lack of Transportation (Non-Medical):  No   Physical Activity: Not on file   Stress: Not on file   Social Connections: Not on file   Intimate Partner Violence: Not on file   Housing Stability: Unknown    Unable to Pay for Housing in the Last Year: Not on file    Number of Places Lived in the Last Year: Not on file

## 2023-03-24 LAB — ANA SER QL: NEGATIVE

## 2023-03-28 LAB
Lab: NORMAL
REPORT: NORMAL
THIS TEST SENT TO: NORMAL

## 2023-04-17 RX ORDER — FEBUXOSTAT 40 MG/1
40 TABLET, FILM COATED ORAL DAILY
Qty: 30 TABLET | Refills: 5 | Status: SHIPPED | OUTPATIENT
Start: 2023-04-17

## 2023-04-17 NOTE — TELEPHONE ENCOUNTER
----- Message from Christina Claros sent at 4/17/2023 11:07 AM EDT -----  Subject: Refill Request    QUESTIONS  Name of Medication? febuxostat (ULORIC) 40 MG TABS tablet  Patient-reported dosage and instructions? Take 1 tablet by mouth daily  How many days do you have left? 0  Preferred Pharmacy? [facility] phone number (if available)? [phone]  ---------------------------------------------------------------------------  --------------  CALL BACK INFO  What is the best way for the office to contact you? OK to leave message on   voicemail  Preferred Call Back Phone Number? [phone]  ---------------------------------------------------------------------------  --------------  SCRIPT ANSWERS  Relationship to Patient?  Self

## 2023-04-17 NOTE — TELEPHONE ENCOUNTER
Pt stated that his back is sore and he would like an xray of his lower back/guidance on what he should do

## 2023-04-18 ENCOUNTER — OFFICE VISIT (OUTPATIENT)
Dept: FAMILY MEDICINE CLINIC | Age: 68
End: 2023-04-18
Payer: MEDICARE

## 2023-04-18 VITALS
BODY MASS INDEX: 31.25 KG/M2 | HEIGHT: 69 IN | SYSTOLIC BLOOD PRESSURE: 142 MMHG | DIASTOLIC BLOOD PRESSURE: 96 MMHG | HEART RATE: 86 BPM | RESPIRATION RATE: 20 BRPM | WEIGHT: 211 LBS | TEMPERATURE: 97.9 F | OXYGEN SATURATION: 96 %

## 2023-04-18 DIAGNOSIS — K58.1 IRRITABLE BOWEL SYNDROME WITH CONSTIPATION: ICD-10-CM

## 2023-04-18 DIAGNOSIS — R07.81 RIB PAIN: Primary | ICD-10-CM

## 2023-04-18 LAB
BILIRUBIN, POC: NEGATIVE
BLOOD URINE, POC: NEGATIVE
CLARITY, POC: CLEAR
COLOR, POC: YELLOW
GLUCOSE URINE, POC: NEGATIVE
KETONES, POC: NEGATIVE
LEUKOCYTE EST, POC: NEGATIVE
NITRITE, POC: NEGATIVE
PH, POC: 6
PROTEIN, POC: NEGATIVE
SPECIFIC GRAVITY, POC: 1.02
UROBILINOGEN, POC: NORMAL

## 2023-04-18 PROCEDURE — 3077F SYST BP >= 140 MM HG: CPT | Performed by: PHYSICIAN ASSISTANT

## 2023-04-18 PROCEDURE — 81002 URINALYSIS NONAUTO W/O SCOPE: CPT | Performed by: PHYSICIAN ASSISTANT

## 2023-04-18 PROCEDURE — 99214 OFFICE O/P EST MOD 30 MIN: CPT | Performed by: PHYSICIAN ASSISTANT

## 2023-04-18 PROCEDURE — 1123F ACP DISCUSS/DSCN MKR DOCD: CPT | Performed by: PHYSICIAN ASSISTANT

## 2023-04-18 PROCEDURE — 3080F DIAST BP >= 90 MM HG: CPT | Performed by: PHYSICIAN ASSISTANT

## 2023-04-18 RX ORDER — HYOSCYAMINE SULFATE 0.125 MG
125 TABLET ORAL EVERY 4 HOURS PRN
Qty: 24 TABLET | Refills: 0 | Status: SHIPPED | OUTPATIENT
Start: 2023-04-18

## 2023-04-18 RX ORDER — METHYLPREDNISOLONE 4 MG/1
TABLET ORAL
Qty: 1 KIT | Refills: 0 | Status: SHIPPED | OUTPATIENT
Start: 2023-04-18

## 2023-04-18 NOTE — PROGRESS NOTES
23  Melanie Callejas : 1955 Sex: male  Age 78 y.o. Subjective:  Chief Complaint   Patient presents with    Back Pain     Cam Necessary down steps 1 week a go back still hurting         HPI:   Melanie Callejas , 78 y.o. male presents to express care for evaluation of left-sided posterior rib pain    HPI  67-year-old male presents to express care for evaluation of left posterior rib pain. The patient had slipped down 4 steps about a week ago. The patient states that he still having quite a bit of pain. Initially was the left hip now seems to be more to the posterior aspect of the left ribs. He notes with any significant movement or with breathing. The patient is not having right-sided pain. No anterior chest pain. No syncope. No hemoptysis. The patient denies any dysuria, hematuria. The patient is not on any anticoagulants. ROS:   Unless otherwise stated in this report the patient's positive and negative responses for review of systems for constitutional, eyes, ENT, cardiovascular, respiratory, gastrointestinal, neurological, , musculoskeletal, and integument systems and related systems to the presenting problem are either stated in the history of present illness or were not pertinent or were negative for the symptoms and/or complaints related to the presenting medical problem. Positives and pertinent negatives as per HPI. All others reviewed and are negative.       PMH:     Past Medical History:   Diagnosis Date    Anxiety     GERD (gastroesophageal reflux disease)     Gout     Hyperlipidemia     Irritable bowel syndrome        Past Surgical History:   Procedure Laterality Date    SHOULDER SURGERY Right     shoulder replacment    TONSILLECTOMY         Family History   Problem Relation Age of Onset    Heart Disease Mother     Dementia Mother     Heart Disease Father         heart valve replaced    Kidney Disease Father     No Known Problems Sister     No Known Problems Brother     Bone

## 2023-04-21 ENCOUNTER — TELEPHONE (OUTPATIENT)
Dept: FAMILY MEDICINE CLINIC | Age: 68
End: 2023-04-21

## 2023-04-21 DIAGNOSIS — M54.50 ACUTE BILATERAL LOW BACK PAIN WITHOUT SCIATICA: Primary | ICD-10-CM

## 2023-04-21 NOTE — TELEPHONE ENCOUNTER
Pt stated he only went to [facility]. He stated he told them where his pain was and he doesn't understand why they didn't do imaging. Pt is requesting an xray order to have done on his lower back.  Please advise

## 2023-04-21 NOTE — TELEPHONE ENCOUNTER
Pt called in stating he is still have lower middle back pain from his fall 10 days ago. Pt stated he did go to walk-in clinic 04/18/2023 and had an xray done for you to review and stated he wanted an appt to discuss. I advised pt the first opening was on 05/16/2023 and he stated he cannot wait that long to be seen and wants an appt asap to discuss and want to know what he should do.  Please advise

## 2023-04-25 ENCOUNTER — TELEPHONE (OUTPATIENT)
Dept: FAMILY MEDICINE CLINIC | Age: 68
End: 2023-04-25

## 2023-04-25 NOTE — TELEPHONE ENCOUNTER
Ramiro  Pt called in and was upset about our phone system; I tried to explain that he needs to hit prescription refill to get a script filled and he will get a vm if they are not able to answer; he said someone should be able to answer his call everytime that is not acceptable; I tried to calm him down but he said basically he doesnot want to leave a vm; I put him over to the nurse vm I cannot discuss scripts I am  ; plus he wants meds that he is not prescribed at this time I told him he probably needs an appt to discuss;

## 2023-04-25 NOTE — TELEPHONE ENCOUNTER
Pt stated that Levsin did not work for him and if there was anything else he could try/or be prescribed/?  Please advise

## 2023-04-25 NOTE — TELEPHONE ENCOUNTER
Pt called in and left a voicemail requesting something be sent in for his IBS and abdominal cramping. He was not happy on the voicemail about the phone system. Please advise.

## 2023-05-17 ENCOUNTER — OFFICE VISIT (OUTPATIENT)
Dept: FAMILY MEDICINE CLINIC | Age: 68
End: 2023-05-17
Payer: MEDICARE

## 2023-05-17 VITALS
TEMPERATURE: 97.8 F | HEIGHT: 69 IN | WEIGHT: 206 LBS | OXYGEN SATURATION: 95 % | BODY MASS INDEX: 30.51 KG/M2 | DIASTOLIC BLOOD PRESSURE: 80 MMHG | SYSTOLIC BLOOD PRESSURE: 140 MMHG | HEART RATE: 69 BPM

## 2023-05-17 DIAGNOSIS — R10.30 LOWER ABDOMINAL PAIN: Primary | ICD-10-CM

## 2023-05-17 PROCEDURE — 3077F SYST BP >= 140 MM HG: CPT | Performed by: PHYSICIAN ASSISTANT

## 2023-05-17 PROCEDURE — 3079F DIAST BP 80-89 MM HG: CPT | Performed by: PHYSICIAN ASSISTANT

## 2023-05-17 PROCEDURE — 1123F ACP DISCUSS/DSCN MKR DOCD: CPT | Performed by: PHYSICIAN ASSISTANT

## 2023-05-17 PROCEDURE — 99215 OFFICE O/P EST HI 40 MIN: CPT | Performed by: PHYSICIAN ASSISTANT

## 2023-05-17 RX ORDER — CIPROFLOXACIN 500 MG/1
500 TABLET, FILM COATED ORAL 2 TIMES DAILY
COMMUNITY
Start: 2023-05-12

## 2023-05-17 RX ORDER — DEXLANSOPRAZOLE 30 MG/1
30 CAPSULE, DELAYED RELEASE ORAL DAILY
COMMUNITY
Start: 2023-05-10

## 2023-05-17 NOTE — PROGRESS NOTES
23  Sushma Barnett : 1955 Sex: male  Age 78 y.o. Subjective:  Chief Complaint   Patient presents with    Abdominal Pain     Started 5 months ago. Back Pain         HPI:   Sushma Barnett , 78 y.o. male presents to [facility] care for evaluation of lower abdominal pain, back pain    HPI  81-year-old male with a history of chronic abdominal pain, presents today for evaluation of lower abdominal pain and back pain. The patient has been complaining of cramp-like pain that occurs throughout the course of the day but seems to be worse at night. He has had the symptoms ongoing for about 5 to 6 months now, the patient does have evidence of a 6.9 cystic lesion on the right side of the bladder causing mass's affect on the bladder. The patient has been treated with Bentyl, Levsin, and he is currently on ciprofloxacin and Dexilant. He was told that he does have IBS and GERD. He has gone to multiple GI specialist.  He states that since starting the antibiotic last week he has had worsening pain in his abdomen and his low back. The patient states that in the middle of the night last night it became so severe that he considered going to the emergency department. It is still quite severe. The patient has not any fevers. ROS:   Unless otherwise stated in this report the patient's positive and negative responses for review of systems for constitutional, eyes, ENT, cardiovascular, respiratory, gastrointestinal, neurological, , musculoskeletal, and integument systems and related systems to the presenting problem are either stated in the history of present illness or were not pertinent or were negative for the symptoms and/or complaints related to the presenting medical problem. Positives and pertinent negatives as per HPI. All others reviewed and are negative.       PMH:     Past Medical History:   Diagnosis Date    Anxiety     GERD (gastroesophageal reflux disease)     Gout     Hyperlipidemia

## 2023-05-30 NOTE — PROGRESS NOTES
[facility]  Department of Family Medicine      Patient:  Wendy Shah 78 y.o. male     Date of Service: 6/1/23      Chief complaint:   Chief Complaint   Patient presents with    New Patient    Abdominal Pain    Back Pain         History ofPresent Illness   The patient is a 78 y.o. male  presented to the clinic with complaints as above. NTP    Abdominal pain  -UC follow up  -has seen GI for this, unclear what he has, concerns for IBS vs diverticulosis, has been on multiple medications for this and has not helped   -has had c scope, everything looked ok  -possible cyst of bladder, saw specialist, stated nothing wrong with cyst, referred to different GI doctor, thought this was SUDS?  -gave abx by previous GI doctor and gave ciprofloxacin and this cleared up his problems     Hx of gout  -on febuxostat, tolerating this well    Hx of recurring sinus infections  -currently, feels well     Past Medical History:      Diagnosis Date    Anxiety     GERD (gastroesophageal reflux disease)     Gout     Hyperlipidemia     Irritable bowel syndrome        PastSurgical History:        Procedure Laterality Date    SHOULDER SURGERY Right     shoulder replacment    TONSILLECTOMY         Allergies:    Patient has no known allergies.     Social History:   Social History     Socioeconomic History    Marital status:      Spouse name: Not on file    Number of children: Not on file    Years of education: Not on file    Highest education level: Not on file   Occupational History    Not on file   Tobacco Use    Smoking status: Never    Smokeless tobacco: Never   Vaping Use    Vaping Use: Never used   Substance and Sexual Activity    Alcohol use: Yes     Comment: rare    Drug use: No    Sexual activity: Yes     Partners: Female   Other Topics Concern    Not on file   Social History Narrative    Not on file     Social Determinants of Health     Financial Resource Strain: Low Risk     Difficulty of Paying

## 2023-06-01 ENCOUNTER — OFFICE VISIT (OUTPATIENT)
Dept: PRIMARY CARE CLINIC | Age: 68
End: 2023-06-01

## 2023-06-01 VITALS
OXYGEN SATURATION: 98 % | WEIGHT: 206 LBS | HEIGHT: 69 IN | TEMPERATURE: 97 F | BODY MASS INDEX: 30.51 KG/M2 | DIASTOLIC BLOOD PRESSURE: 86 MMHG | RESPIRATION RATE: 18 BRPM | SYSTOLIC BLOOD PRESSURE: 132 MMHG | HEART RATE: 77 BPM

## 2023-06-01 DIAGNOSIS — M1A.9XX0 CHRONIC GOUT WITHOUT TOPHUS, UNSPECIFIED CAUSE, UNSPECIFIED SITE: Primary | ICD-10-CM

## 2023-06-01 DIAGNOSIS — J31.0 CHRONIC RHINITIS: ICD-10-CM

## 2023-06-01 SDOH — ECONOMIC STABILITY: FOOD INSECURITY: WITHIN THE PAST 12 MONTHS, YOU WORRIED THAT YOUR FOOD WOULD RUN OUT BEFORE YOU GOT MONEY TO BUY MORE.: NEVER TRUE

## 2023-06-01 SDOH — ECONOMIC STABILITY: FOOD INSECURITY: WITHIN THE PAST 12 MONTHS, THE FOOD YOU BOUGHT JUST DIDN'T LAST AND YOU DIDN'T HAVE MONEY TO GET MORE.: NEVER TRUE

## 2023-06-01 SDOH — ECONOMIC STABILITY: INCOME INSECURITY: HOW HARD IS IT FOR YOU TO PAY FOR THE VERY BASICS LIKE FOOD, HOUSING, MEDICAL CARE, AND HEATING?: NOT VERY HARD

## 2023-06-01 ASSESSMENT — PATIENT HEALTH QUESTIONNAIRE - PHQ9
9. THOUGHTS THAT YOU WOULD BE BETTER OFF DEAD, OR OF HURTING YOURSELF: 0
4. FEELING TIRED OR HAVING LITTLE ENERGY: 0
1. LITTLE INTEREST OR PLEASURE IN DOING THINGS: 0
2. FEELING DOWN, DEPRESSED OR HOPELESS: 0
SUM OF ALL RESPONSES TO PHQ9 QUESTIONS 1 & 2: 0
SUM OF ALL RESPONSES TO PHQ QUESTIONS 1-9: 0
3. TROUBLE FALLING OR STAYING ASLEEP: 0
10. IF YOU CHECKED OFF ANY PROBLEMS, HOW DIFFICULT HAVE THESE PROBLEMS MADE IT FOR YOU TO DO YOUR WORK, TAKE CARE OF THINGS AT HOME, OR GET ALONG WITH OTHER PEOPLE: 0
5. POOR APPETITE OR OVEREATING: 0
6. FEELING BAD ABOUT YOURSELF - OR THAT YOU ARE A FAILURE OR HAVE LET YOURSELF OR YOUR FAMILY DOWN: 0
8. MOVING OR SPEAKING SO SLOWLY THAT OTHER PEOPLE COULD HAVE NOTICED. OR THE OPPOSITE, BEING SO FIGETY OR RESTLESS THAT YOU HAVE BEEN MOVING AROUND A LOT MORE THAN USUAL: 0
SUM OF ALL RESPONSES TO PHQ QUESTIONS 1-9: 0
7. TROUBLE CONCENTRATING ON THINGS, SUCH AS READING THE NEWSPAPER OR WATCHING TELEVISION: 0

## 2023-07-25 ENCOUNTER — OFFICE VISIT (OUTPATIENT)
Dept: PRIMARY CARE CLINIC | Age: 68
End: 2023-07-25
Payer: MEDICARE

## 2023-07-25 VITALS
RESPIRATION RATE: 17 BRPM | HEART RATE: 63 BPM | HEIGHT: 69 IN | TEMPERATURE: 97.1 F | WEIGHT: 206 LBS | OXYGEN SATURATION: 99 % | BODY MASS INDEX: 30.51 KG/M2 | DIASTOLIC BLOOD PRESSURE: 80 MMHG | SYSTOLIC BLOOD PRESSURE: 126 MMHG

## 2023-07-25 DIAGNOSIS — R10.9 CHRONIC ABDOMINAL PAIN: Primary | ICD-10-CM

## 2023-07-25 DIAGNOSIS — M1A.9XX0 CHRONIC GOUT WITHOUT TOPHUS, UNSPECIFIED CAUSE, UNSPECIFIED SITE: Primary | ICD-10-CM

## 2023-07-25 DIAGNOSIS — G89.29 CHRONIC ABDOMINAL PAIN: Primary | ICD-10-CM

## 2023-07-25 DIAGNOSIS — R10.9 CHRONIC ABDOMINAL PAIN: ICD-10-CM

## 2023-07-25 DIAGNOSIS — G89.29 CHRONIC ABDOMINAL PAIN: ICD-10-CM

## 2023-07-25 PROCEDURE — 1123F ACP DISCUSS/DSCN MKR DOCD: CPT | Performed by: STUDENT IN AN ORGANIZED HEALTH CARE EDUCATION/TRAINING PROGRAM

## 2023-07-25 PROCEDURE — 3074F SYST BP LT 130 MM HG: CPT | Performed by: STUDENT IN AN ORGANIZED HEALTH CARE EDUCATION/TRAINING PROGRAM

## 2023-07-25 PROCEDURE — 3079F DIAST BP 80-89 MM HG: CPT | Performed by: STUDENT IN AN ORGANIZED HEALTH CARE EDUCATION/TRAINING PROGRAM

## 2023-07-25 PROCEDURE — 99214 OFFICE O/P EST MOD 30 MIN: CPT | Performed by: STUDENT IN AN ORGANIZED HEALTH CARE EDUCATION/TRAINING PROGRAM

## 2023-07-25 RX ORDER — AMITRIPTYLINE HYDROCHLORIDE 10 MG/1
10 TABLET, FILM COATED ORAL NIGHTLY
Qty: 90 TABLET | Refills: 1 | Status: SHIPPED | OUTPATIENT
Start: 2023-07-25

## 2023-07-25 RX ORDER — HYOSCYAMINE SULFATE EXTENDED-RELEASE 0.38 MG/1
375 TABLET ORAL EVERY 12 HOURS PRN
COMMUNITY

## 2023-07-25 RX ORDER — DEXLANSOPRAZOLE 30 MG/1
30 CAPSULE, DELAYED RELEASE ORAL DAILY
COMMUNITY
End: 2023-07-25 | Stop reason: SDUPTHER

## 2023-07-25 RX ORDER — FEBUXOSTAT 40 MG/1
40 TABLET, FILM COATED ORAL DAILY
Qty: 30 TABLET | Refills: 5 | Status: SHIPPED | OUTPATIENT
Start: 2023-07-25

## 2023-07-25 RX ORDER — DEXLANSOPRAZOLE 30 MG/1
30 CAPSULE, DELAYED RELEASE ORAL DAILY
Qty: 90 CAPSULE | Refills: 0 | Status: SHIPPED | OUTPATIENT
Start: 2023-07-25

## 2023-07-25 NOTE — PROGRESS NOTES
[facility]  Department of Family Medicine      Patient:  Ramonita Martniez 78 y.o. male     Date of Service: 7/25/23      Chief complaint:   Chief Complaint   Patient presents with    Irritable Bowel Syndrome    Abdominal Pain           Back Pain         History ofPresent Illness   The patient is a 78 y.o. male  presented to the clinic with complaints as above. Abdominal pain  -recurring issue for him, has been worsening   -has seen multiple specialists for this, was told he has IBS and GERD  -pain goes into his back  -hycosamine did not help, PPI helps only when gerd flares up   -not having issues with moving his bowels  -pain lasts all day, gets worse before and after he eats     Gout  -f/u  -tolerating febuxostat  -last gout flare up has been years    Past Medical History:      Diagnosis Date    Anxiety     GERD (gastroesophageal reflux disease)     Gout     Hyperlipidemia     Irritable bowel syndrome        PastSurgical History:        Procedure Laterality Date    SHOULDER SURGERY Right     shoulder replacment    TONSILLECTOMY         Allergies:    Patient has no known allergies.     Social History:   Social History     Socioeconomic History    Marital status:      Spouse name: Not on file    Number of children: Not on file    Years of education: Not on file    Highest education level: Not on file   Occupational History    Not on file   Tobacco Use    Smoking status: Never    Smokeless tobacco: Never   Vaping Use    Vaping Use: Never used   Substance and Sexual Activity    Alcohol use: Yes     Comment: rare    Drug use: No    Sexual activity: Yes     Partners: Female   Other Topics Concern    Not on file   Social History Narrative    Not on file     Social Determinants of Health     Financial Resource Strain: Low Risk     Difficulty of Paying Living Expenses: Not very hard   Food Insecurity: No Food Insecurity    Worried About Running Out of Food in the Last Year: Never true

## 2023-08-02 ENCOUNTER — HOSPITAL ENCOUNTER (EMERGENCY)
Age: 68
Discharge: HOME | End: 2023-08-02
Attending: EMERGENCY MEDICINE
Payer: MEDICARE

## 2023-08-02 ENCOUNTER — APPOINTMENT (OUTPATIENT)
Dept: CT IMAGING | Age: 68
End: 2023-08-02
Payer: MEDICARE

## 2023-08-02 VITALS
DIASTOLIC BLOOD PRESSURE: 99 MMHG | HEART RATE: 64 BPM | RESPIRATION RATE: 16 BRPM | OXYGEN SATURATION: 99 % | SYSTOLIC BLOOD PRESSURE: 156 MMHG | TEMPERATURE: 98.5 F

## 2023-08-02 DIAGNOSIS — R10.9 ABDOMINAL PAIN, UNSPECIFIED ABDOMINAL LOCATION: ICD-10-CM

## 2023-08-02 DIAGNOSIS — M54.50 ACUTE RIGHT-SIDED LOW BACK PAIN, UNSPECIFIED WHETHER SCIATICA PRESENT: ICD-10-CM

## 2023-08-02 DIAGNOSIS — R10.9 FLANK PAIN: Primary | ICD-10-CM

## 2023-08-02 DIAGNOSIS — N32.89 BLADDER MASS: ICD-10-CM

## 2023-08-02 LAB
ALBUMIN SERPL-MCNC: 4.3 G/DL (ref 3.5–5.2)
ALP SERPL-CCNC: 95 U/L (ref 40–129)
ALT SERPL-CCNC: 13 U/L (ref 0–40)
ANION GAP SERPL CALCULATED.3IONS-SCNC: 10 MMOL/L (ref 7–16)
AST SERPL-CCNC: 19 U/L (ref 0–39)
BASOPHILS # BLD: 0.05 K/UL (ref 0–0.2)
BASOPHILS NFR BLD: 1 % (ref 0–2)
BILIRUB SERPL-MCNC: 0.4 MG/DL (ref 0–1.2)
BILIRUB UR QL STRIP: NEGATIVE
BUN SERPL-MCNC: 21 MG/DL (ref 6–23)
CALCIUM SERPL-MCNC: 9.3 MG/DL (ref 8.6–10.2)
CHLORIDE SERPL-SCNC: 106 MMOL/L (ref 98–107)
CLARITY UR: CLEAR
CO2 SERPL-SCNC: 23 MMOL/L (ref 22–29)
COLOR UR: YELLOW
COMMENT: NORMAL
CREAT SERPL-MCNC: 1 MG/DL (ref 0.7–1.2)
EOSINOPHIL # BLD: 0.06 K/UL (ref 0.05–0.5)
EOSINOPHILS RELATIVE PERCENT: 1 % (ref 0–6)
ERYTHROCYTE [DISTWIDTH] IN BLOOD BY AUTOMATED COUNT: 12.3 % (ref 11.5–15)
GFR SERPL CREATININE-BSD FRML MDRD: >60 ML/MIN/1.73M2
GLUCOSE SERPL-MCNC: 86 MG/DL (ref 74–99)
GLUCOSE UR STRIP-MCNC: NEGATIVE MG/DL
HCT VFR BLD AUTO: 47.5 % (ref 37–54)
HGB BLD-MCNC: 16.5 G/DL (ref 12.5–16.5)
HGB UR QL STRIP.AUTO: NEGATIVE
IMM GRANULOCYTES # BLD AUTO: <0.03 K/UL (ref 0–0.58)
IMM GRANULOCYTES NFR BLD: 0 % (ref 0–5)
KETONES UR STRIP-MCNC: NEGATIVE MG/DL
LACTATE BLDV-SCNC: 1.4 MMOL/L (ref 0.5–2.2)
LEUKOCYTE ESTERASE UR QL STRIP: NEGATIVE
LYMPHOCYTES NFR BLD: 1.3 K/UL (ref 1.5–4)
LYMPHOCYTES RELATIVE PERCENT: 20 % (ref 20–42)
MCH RBC QN AUTO: 30.1 PG (ref 26–35)
MCHC RBC AUTO-ENTMCNC: 34.7 G/DL (ref 32–34.5)
MCV RBC AUTO: 86.7 FL (ref 80–99.9)
MONOCYTES NFR BLD: 0.55 K/UL (ref 0.1–0.95)
MONOCYTES NFR BLD: 8 % (ref 2–12)
NEUTROPHILS NFR BLD: 70 % (ref 43–80)
NEUTS SEG NFR BLD: 4.7 K/UL (ref 1.8–7.3)
NITRITE UR QL STRIP: NEGATIVE
PH UR STRIP: 6 [PH] (ref 5–9)
PLATELET # BLD AUTO: 261 K/UL (ref 130–450)
PMV BLD AUTO: 10.1 FL (ref 7–12)
POTASSIUM SERPL-SCNC: 4.4 MMOL/L (ref 3.5–5)
PROT SERPL-MCNC: 7.3 G/DL (ref 6.4–8.3)
PROT UR STRIP-MCNC: NEGATIVE MG/DL
RBC # BLD AUTO: 5.48 M/UL (ref 3.8–5.8)
SODIUM SERPL-SCNC: 139 MMOL/L (ref 132–146)
SP GR UR STRIP: 1.01 (ref 1–1.03)
UROBILINOGEN UR STRIP-ACNC: 0.2 EU/DL (ref 0–1)
WBC OTHER # BLD: 6.7 K/UL (ref 4.5–11.5)

## 2023-08-02 PROCEDURE — 2580000003 HC RX 258

## 2023-08-02 PROCEDURE — 80053 COMPREHEN METABOLIC PANEL: CPT

## 2023-08-02 PROCEDURE — 99285 EMERGENCY DEPT VISIT HI MDM: CPT

## 2023-08-02 PROCEDURE — 96374 THER/PROPH/DIAG INJ IV PUSH: CPT

## 2023-08-02 PROCEDURE — 6360000004 HC RX CONTRAST MEDICATION: Performed by: RADIOLOGY

## 2023-08-02 PROCEDURE — 6370000000 HC RX 637 (ALT 250 FOR IP)

## 2023-08-02 PROCEDURE — 81003 URINALYSIS AUTO W/O SCOPE: CPT

## 2023-08-02 PROCEDURE — 83605 ASSAY OF LACTIC ACID: CPT

## 2023-08-02 PROCEDURE — 96375 TX/PRO/DX INJ NEW DRUG ADDON: CPT

## 2023-08-02 PROCEDURE — 74177 CT ABD & PELVIS W/CONTRAST: CPT

## 2023-08-02 PROCEDURE — 85025 COMPLETE CBC W/AUTO DIFF WBC: CPT

## 2023-08-02 PROCEDURE — 6360000002 HC RX W HCPCS

## 2023-08-02 RX ORDER — KETOROLAC TROMETHAMINE 10 MG/1
10 TABLET, FILM COATED ORAL EVERY 6 HOURS PRN
Qty: 20 TABLET | Refills: 0 | Status: SHIPPED | OUTPATIENT
Start: 2023-08-02

## 2023-08-02 RX ORDER — FENTANYL CITRATE 50 UG/ML
50 INJECTION, SOLUTION INTRAMUSCULAR; INTRAVENOUS ONCE
Status: COMPLETED | OUTPATIENT
Start: 2023-08-02 | End: 2023-08-02

## 2023-08-02 RX ORDER — MORPHINE SULFATE 8 MG/ML
6 INJECTION, SOLUTION INTRAMUSCULAR; INTRAVENOUS ONCE
Status: COMPLETED | OUTPATIENT
Start: 2023-08-02 | End: 2023-08-02

## 2023-08-02 RX ORDER — ONDANSETRON 2 MG/ML
4 INJECTION INTRAMUSCULAR; INTRAVENOUS ONCE
Status: COMPLETED | OUTPATIENT
Start: 2023-08-02 | End: 2023-08-02

## 2023-08-02 RX ORDER — KETOROLAC TROMETHAMINE 30 MG/ML
15 INJECTION, SOLUTION INTRAMUSCULAR; INTRAVENOUS ONCE
Status: COMPLETED | OUTPATIENT
Start: 2023-08-02 | End: 2023-08-02

## 2023-08-02 RX ORDER — TIZANIDINE 4 MG/1
2 TABLET ORAL ONCE
Status: COMPLETED | OUTPATIENT
Start: 2023-08-02 | End: 2023-08-02

## 2023-08-02 RX ORDER — 0.9 % SODIUM CHLORIDE 0.9 %
1000 INTRAVENOUS SOLUTION INTRAVENOUS ONCE
Status: COMPLETED | OUTPATIENT
Start: 2023-08-02 | End: 2023-08-02

## 2023-08-02 RX ORDER — TIZANIDINE 2 MG/1
2 TABLET ORAL 3 TIMES DAILY PRN
Qty: 21 TABLET | Refills: 0 | Status: SHIPPED | OUTPATIENT
Start: 2023-08-02 | End: 2023-08-09

## 2023-08-02 RX ADMIN — KETOROLAC TROMETHAMINE 15 MG: 30 INJECTION, SOLUTION INTRAMUSCULAR; INTRAVENOUS at 15:46

## 2023-08-02 RX ADMIN — SODIUM CHLORIDE 1000 ML: 9 INJECTION, SOLUTION INTRAVENOUS at 11:42

## 2023-08-02 RX ADMIN — TIZANIDINE 2 MG: 4 TABLET ORAL at 15:48

## 2023-08-02 RX ADMIN — MORPHINE SULFATE 6 MG: 8 INJECTION, SOLUTION INTRAMUSCULAR; INTRAVENOUS at 13:04

## 2023-08-02 RX ADMIN — IOPAMIDOL 75 ML: 755 INJECTION, SOLUTION INTRAVENOUS at 14:08

## 2023-08-02 RX ADMIN — FENTANYL CITRATE 50 MCG: 50 INJECTION, SOLUTION INTRAMUSCULAR; INTRAVENOUS at 11:46

## 2023-08-02 RX ADMIN — ONDANSETRON 4 MG: 2 INJECTION INTRAMUSCULAR; INTRAVENOUS at 11:55

## 2023-08-02 ASSESSMENT — PAIN SCALES - GENERAL
PAINLEVEL_OUTOF10: 10

## 2023-08-02 NOTE — ED NOTES
Report to [facility], care of patient relinquished.       Garrett Garnica RN  08/02/23 [id number]

## 2023-08-02 NOTE — ED PROVIDER NOTES
% injection 75 mL (75 mLs IntraVENous Given 8/2/23 1408)   ketorolac (TORADOL) injection 15 mg (15 mg IntraVENous Given 8/2/23 1546)   tiZANidine (ZANAFLEX) tablet 2 mg (2 mg Oral Given 8/2/23 1548)               I am the Primary Clinician of Record. Final impression  1. Flank pain    2. Abdominal pain, unspecified abdominal location    3. Bladder mass    4. Acute right-sided low back pain, unspecified whether sciatica present          Disposition/plan  DISPOSITION Decision To Discharge 08/02/2023 02:58:19 PM  Patient agrees with the plan, states their verbal understanding, and has all questions answered.     PATIENT REFERRED TO:  [facility] [place] [address]  [phone]    Call in 3 days  As needed    Vivienne Cloud MD  [address] [facility] [id number]    Call in 1 day      Mike Zamora, [address] [place] [address]  [phone]    Call in 1 day        DISCHARGE MEDICATIONS:  Discharge Medication List as of 8/2/2023  4:30 PM        START taking these medications    Details   ketorolac (TORADOL) 10 MG tablet Take 1 tablet by mouth every 6 hours as needed for Pain Tolerated IM Toradol in the emergency room, Disp-20 tablet, R-0Normal      tiZANidine (ZANAFLEX) 2 MG tablet Take 1 tablet by mouth 3 times daily as needed (back pain), Disp-21 tablet, R-0Normal             DISCONTINUED MEDICATIONS:  Discharge Medication List as of 8/2/2023  4:30 PM                 (Please note that portions of this note were completed with a voice recognition program.  Efforts were made to edit the dictations but occasionally words are mis-transcribed.)         Mack Valencia MD  Resident  08/03/23 [id number]

## 2023-08-02 NOTE — DISCHARGE INSTRUCTIONS
CT scan findings:  IMPRESSION:  1. Approximate 7 cm cyst or cystic mass with some minimal mural  calcification to the right of the urinary bladder somewhat deforming urinary bladder. This is of indeterminate etiology and could represent some form of atypical bladder diverticulum or a developmental cyst or duplication cyst.  A neoplastic lesion is not excluded but it is unchanged. 2.  A normal appearing appendix is noted. 3.  Diverticulosis without evidence of definite acute diverticulitis. Otherwise nonspecific bowel gas pattern. 4.  Otherwise no acute pathology or significant change.

## 2023-08-03 ENCOUNTER — OFFICE VISIT (OUTPATIENT)
Dept: PRIMARY CARE CLINIC | Age: 68
End: 2023-08-03
Payer: MEDICARE

## 2023-08-03 VITALS
OXYGEN SATURATION: 96 % | HEART RATE: 86 BPM | HEIGHT: 69 IN | TEMPERATURE: 97.5 F | RESPIRATION RATE: 17 BRPM | DIASTOLIC BLOOD PRESSURE: 78 MMHG | SYSTOLIC BLOOD PRESSURE: 118 MMHG | WEIGHT: 206 LBS | BODY MASS INDEX: 30.51 KG/M2

## 2023-08-03 DIAGNOSIS — R11.2 NAUSEA AND VOMITING, UNSPECIFIED VOMITING TYPE: ICD-10-CM

## 2023-08-03 DIAGNOSIS — Z00.00 INITIAL MEDICARE ANNUAL WELLNESS VISIT: Primary | ICD-10-CM

## 2023-08-03 PROCEDURE — 1123F ACP DISCUSS/DSCN MKR DOCD: CPT | Performed by: STUDENT IN AN ORGANIZED HEALTH CARE EDUCATION/TRAINING PROGRAM

## 2023-08-03 PROCEDURE — G0438 PPPS, INITIAL VISIT: HCPCS | Performed by: STUDENT IN AN ORGANIZED HEALTH CARE EDUCATION/TRAINING PROGRAM

## 2023-08-03 PROCEDURE — 3074F SYST BP LT 130 MM HG: CPT | Performed by: STUDENT IN AN ORGANIZED HEALTH CARE EDUCATION/TRAINING PROGRAM

## 2023-08-03 PROCEDURE — 3078F DIAST BP <80 MM HG: CPT | Performed by: STUDENT IN AN ORGANIZED HEALTH CARE EDUCATION/TRAINING PROGRAM

## 2023-08-03 RX ORDER — PANTOPRAZOLE SODIUM 40 MG/1
40 TABLET, DELAYED RELEASE ORAL
Qty: 180 TABLET | Refills: 1 | Status: SHIPPED | OUTPATIENT
Start: 2023-08-03

## 2023-08-03 ASSESSMENT — PATIENT HEALTH QUESTIONNAIRE - PHQ9
1. LITTLE INTEREST OR PLEASURE IN DOING THINGS: 0
2. FEELING DOWN, DEPRESSED OR HOPELESS: 0
SUM OF ALL RESPONSES TO PHQ QUESTIONS 1-9: 0
SUM OF ALL RESPONSES TO PHQ9 QUESTIONS 1 & 2: 0
SUM OF ALL RESPONSES TO PHQ QUESTIONS 1-9: 0

## 2023-08-03 ASSESSMENT — LIFESTYLE VARIABLES
HOW MANY STANDARD DRINKS CONTAINING ALCOHOL DO YOU HAVE ON A TYPICAL DAY: 1 OR 2
HOW OFTEN DO YOU HAVE A DRINK CONTAINING ALCOHOL: 2-3 TIMES A WEEK

## 2023-08-19 ENCOUNTER — HOSPITAL ENCOUNTER (EMERGENCY)
Age: 68
Discharge: HOME | End: 2023-08-19
Payer: MEDICARE

## 2023-08-19 VITALS
RESPIRATION RATE: 16 BRPM | TEMPERATURE: 98.2 F | HEIGHT: 69 IN | BODY MASS INDEX: 29.62 KG/M2 | DIASTOLIC BLOOD PRESSURE: 97 MMHG | HEART RATE: 99 BPM | SYSTOLIC BLOOD PRESSURE: 159 MMHG | OXYGEN SATURATION: 98 % | WEIGHT: 200 LBS

## 2023-08-19 DIAGNOSIS — L50.9 URTICARIA: ICD-10-CM

## 2023-08-19 DIAGNOSIS — T78.40XA ALLERGIC REACTION, INITIAL ENCOUNTER: Primary | ICD-10-CM

## 2023-08-19 PROCEDURE — 99283 EMERGENCY DEPT VISIT LOW MDM: CPT

## 2023-08-19 PROCEDURE — 6370000000 HC RX 637 (ALT 250 FOR IP)

## 2023-08-19 RX ORDER — DIPHENHYDRAMINE HCL 25 MG
50 TABLET ORAL ONCE
Status: DISCONTINUED | OUTPATIENT
Start: 2023-08-19 | End: 2023-08-19 | Stop reason: HOSPADM

## 2023-08-19 RX ORDER — FAMOTIDINE 20 MG/1
20 TABLET, FILM COATED ORAL 2 TIMES DAILY
Qty: 14 TABLET | Refills: 0 | Status: SHIPPED | OUTPATIENT
Start: 2023-08-19 | End: 2023-08-22 | Stop reason: SDUPTHER

## 2023-08-19 RX ORDER — FAMOTIDINE 20 MG/1
20 TABLET, FILM COATED ORAL ONCE
Status: COMPLETED | OUTPATIENT
Start: 2023-08-19 | End: 2023-08-19

## 2023-08-19 RX ORDER — DIPHENHYDRAMINE HCL 25 MG
50 CAPSULE ORAL EVERY 12 HOURS PRN
Qty: 14 CAPSULE | Refills: 0 | Status: SHIPPED | OUTPATIENT
Start: 2023-08-19 | End: 2023-08-22

## 2023-08-19 RX ORDER — METHYLPREDNISOLONE 4 MG/1
TABLET ORAL
Qty: 21 TABLET | Refills: 0 | Status: SHIPPED | OUTPATIENT
Start: 2023-08-19 | End: 2023-08-19

## 2023-08-19 RX ADMIN — FAMOTIDINE 20 MG: 20 TABLET ORAL at 17:54

## 2023-08-19 ASSESSMENT — PAIN - FUNCTIONAL ASSESSMENT: PAIN_FUNCTIONAL_ASSESSMENT: NONE - DENIES PAIN

## 2023-08-19 NOTE — ED PROVIDER NOTES
Department of Emergency Medicine  FIRST PROVIDER TRIAGE NOTE             Independent MLP           8/19/23  4:16 PM EDT    Date of Encounter: 8/19/23   MRN: [id number]      HPI: Giorgio Lala is a 78 y.o. male who presents to the ED for Allergic Reaction (Had MRI done Thursday) and Rash (On neck and back, 98% or RA)  Has been seen at urgent care 3 times late. He was reportedly given an IM injection of steroids last night and returns today due to redness on his face. He was then given 3 tablets of steroid and told to take 3 more later today. He took those and returned because he had redness in his face that was worsening. He has no complaints of swelling or difficulty breathing. At his third visit to urgent care he was sent to the ED for evaluation. ROS: Negative for cp, sob, cough, or dizziness. PE: Gen Appearance/Constitutional: alert, NAD, facial skin slightly erythematous  HEENT: NC/NT. PERRLA, No or edema of the posterior oropharynx or uvula. Airway patent. Neck: supple  CV: regular rate  Pulm: CTA bilat, no respiratory distress or stridor. Time: 1705-Brought to triage to reassess due to feeling hot all over. No stridor or respiratory distress. No edema of the posterior oropharynx. VS obtained and oxygen saturation 100% on RA. No worsening erythema or hives. Initial Plan of Care: All treatment areas with department are currently occupied. Plan to order/Initiate the following while awaiting opening in ED: medications.   Initiate Treatment-Testing, Proceed toTreatment Area When Bed Available for ED Attending/MLP to Continue Care    Electronically signed by PAULA Siegel CNP   DD: 8/19/23        PAULA Siegel CNP  08/19/23 [facility], APRN - CNP  08/19/23 [address], APRN - CNP  08/19/23 [facility], APRN - CNP  08/19/23 [address]  Department of Emergency Medicine   ED  Encounter Note  Admit Allergic reaction, initial encounter    2. Urticaria      Plan   Discharged home. Patient condition is good    New Medications     Discharge Medication List as of 8/19/2023  6:21 PM        START taking these medications    Details   famotidine (PEPCID) 20 MG tablet Take 1 tablet by mouth 2 times daily for 7 days, Disp-14 tablet, R-0Normal      diphenhydrAMINE (BENADRYL ALLERGY) 25 MG capsule Take 2 capsules by mouth every 12 hours as needed for Itching (itching, rash), Disp-14 capsule, R-0Normal           Electronically signed by PAULA Siegel CNP   DD: 8/19/23  **This report was transcribed using voice recognition software. Every effort was made to ensure accuracy; however, inadvertent computerized transcription errors may be present.   END OF ED PROVIDER NOTE       PAULA Siegel CNP  08/19/23 [id number]

## 2023-08-21 ENCOUNTER — TELEPHONE (OUTPATIENT)
Dept: PRIMARY CARE CLINIC | Age: 68
End: 2023-08-21

## 2023-08-21 NOTE — TELEPHONE ENCOUNTER
Pt had an mri last Thursday   Having a rash possibly from contrast  pt been to urgent care twice and ED     Continues to have episodes of breakouts asking to see pcp, first available is 9/7

## 2023-08-22 ENCOUNTER — OFFICE VISIT (OUTPATIENT)
Dept: PRIMARY CARE CLINIC | Age: 68
End: 2023-08-22
Payer: MEDICARE

## 2023-08-22 VITALS
DIASTOLIC BLOOD PRESSURE: 90 MMHG | SYSTOLIC BLOOD PRESSURE: 120 MMHG | BODY MASS INDEX: 30.81 KG/M2 | HEART RATE: 84 BPM | OXYGEN SATURATION: 99 % | HEIGHT: 69 IN | WEIGHT: 208 LBS | TEMPERATURE: 97.5 F | RESPIRATION RATE: 17 BRPM

## 2023-08-22 DIAGNOSIS — T78.40XS ALLERGIC REACTION, SEQUELA: Primary | ICD-10-CM

## 2023-08-22 PROCEDURE — 3080F DIAST BP >= 90 MM HG: CPT | Performed by: STUDENT IN AN ORGANIZED HEALTH CARE EDUCATION/TRAINING PROGRAM

## 2023-08-22 PROCEDURE — 1123F ACP DISCUSS/DSCN MKR DOCD: CPT | Performed by: STUDENT IN AN ORGANIZED HEALTH CARE EDUCATION/TRAINING PROGRAM

## 2023-08-22 PROCEDURE — 99213 OFFICE O/P EST LOW 20 MIN: CPT | Performed by: STUDENT IN AN ORGANIZED HEALTH CARE EDUCATION/TRAINING PROGRAM

## 2023-08-22 PROCEDURE — 3074F SYST BP LT 130 MM HG: CPT | Performed by: STUDENT IN AN ORGANIZED HEALTH CARE EDUCATION/TRAINING PROGRAM

## 2023-08-22 RX ORDER — FAMOTIDINE 20 MG/1
20 TABLET, FILM COATED ORAL 2 TIMES DAILY
Qty: 60 TABLET | Refills: 0 | Status: SHIPPED | OUTPATIENT
Start: 2023-08-22 | End: 2023-09-21

## 2023-08-22 RX ORDER — FEXOFENADINE HCL 180 MG/1
180 TABLET ORAL DAILY
Qty: 30 TABLET | Refills: 0 | Status: SHIPPED | OUTPATIENT
Start: 2023-08-22 | End: 2023-09-21

## 2023-08-22 RX ORDER — EPINEPHRINE 0.3 MG/.3ML
0.3 INJECTION SUBCUTANEOUS ONCE
Qty: 0.3 ML | Refills: 0 | Status: SHIPPED | OUTPATIENT
Start: 2023-08-22 | End: 2023-08-22

## 2023-08-22 NOTE — PROGRESS NOTES
[address] [facility]  Department of Family Medicine      Patient:  Giorgio Lala 78 y.o. male     Date of Service: 8/22/23      Chief complaint:   Chief Complaint   Patient presents with    Rash     Possible allergic reaction to contrast         History ofPresent Illness   The patient is a 78 y.o. male  presented to the clinic with complaints as above.     Rash  -new issue  -started after he got MRI with contrast  -went in to urgent care, gave steroid shot, did not improve, went back to the urgent care and put on medrol dosepak, swelling wasn't improving and thus sent to the ED, in the ED, they told him to take pepcid and allegra  -feels like the allergra helped, however had swelling Sunday morning  -rash has been pretty much gone today  -denies any shortness of breath or wheezing  -did have lip swelling on the weekend, however this went down     Past Medical History:      Diagnosis Date    Anxiety     GERD (gastroesophageal reflux disease)     Gout     Hyperlipidemia     Irritable bowel syndrome        PastSurgical History:        Procedure Laterality Date    SHOULDER SURGERY Right     shoulder replacment    TONSILLECTOMY         Allergies:    Gadolinium    Social History:   Social History     Socioeconomic History    Marital status:      Spouse name: Not on file    Number of children: Not on file    Years of education: Not on file    Highest education level: Not on file   Occupational History    Not on file   Tobacco Use    Smoking status: Never    Smokeless tobacco: Never   Vaping Use    Vaping Use: Never used   Substance and Sexual Activity    Alcohol use: Yes     Comment: rare    Drug use: No    Sexual activity: Yes     Partners: Female   Other Topics Concern    Not on file   Social History Narrative    Not on file     Social Determinants of Health     Financial Resource Strain: Low Risk     Difficulty of Paying Living Expenses: Not very hard   Food Insecurity: No Food Insecurity

## 2023-11-09 ENCOUNTER — OFFICE VISIT (OUTPATIENT)
Dept: PRIMARY CARE CLINIC | Age: 68
End: 2023-11-09
Payer: MEDICARE

## 2023-11-09 VITALS
SYSTOLIC BLOOD PRESSURE: 126 MMHG | BODY MASS INDEX: 30.96 KG/M2 | WEIGHT: 209 LBS | HEIGHT: 69 IN | RESPIRATION RATE: 17 BRPM | DIASTOLIC BLOOD PRESSURE: 74 MMHG | TEMPERATURE: 96.9 F | OXYGEN SATURATION: 97 % | HEART RATE: 89 BPM

## 2023-11-09 DIAGNOSIS — R10.9 CHRONIC ABDOMINAL PAIN: Primary | ICD-10-CM

## 2023-11-09 DIAGNOSIS — G89.29 CHRONIC LOW BACK PAIN, UNSPECIFIED BACK PAIN LATERALITY, UNSPECIFIED WHETHER SCIATICA PRESENT: ICD-10-CM

## 2023-11-09 DIAGNOSIS — M54.50 CHRONIC LOW BACK PAIN, UNSPECIFIED BACK PAIN LATERALITY, UNSPECIFIED WHETHER SCIATICA PRESENT: ICD-10-CM

## 2023-11-09 DIAGNOSIS — G89.29 CHRONIC ABDOMINAL PAIN: Primary | ICD-10-CM

## 2023-11-09 PROCEDURE — 99213 OFFICE O/P EST LOW 20 MIN: CPT | Performed by: STUDENT IN AN ORGANIZED HEALTH CARE EDUCATION/TRAINING PROGRAM

## 2023-11-09 PROCEDURE — 3074F SYST BP LT 130 MM HG: CPT | Performed by: STUDENT IN AN ORGANIZED HEALTH CARE EDUCATION/TRAINING PROGRAM

## 2023-11-09 PROCEDURE — 3078F DIAST BP <80 MM HG: CPT | Performed by: STUDENT IN AN ORGANIZED HEALTH CARE EDUCATION/TRAINING PROGRAM

## 2023-11-09 PROCEDURE — G8417 CALC BMI ABV UP PARAM F/U: HCPCS | Performed by: STUDENT IN AN ORGANIZED HEALTH CARE EDUCATION/TRAINING PROGRAM

## 2023-11-09 PROCEDURE — G8427 DOCREV CUR MEDS BY ELIG CLIN: HCPCS | Performed by: STUDENT IN AN ORGANIZED HEALTH CARE EDUCATION/TRAINING PROGRAM

## 2023-11-09 PROCEDURE — G8484 FLU IMMUNIZE NO ADMIN: HCPCS | Performed by: STUDENT IN AN ORGANIZED HEALTH CARE EDUCATION/TRAINING PROGRAM

## 2023-11-09 PROCEDURE — 1123F ACP DISCUSS/DSCN MKR DOCD: CPT | Performed by: STUDENT IN AN ORGANIZED HEALTH CARE EDUCATION/TRAINING PROGRAM

## 2023-11-09 PROCEDURE — 3017F COLORECTAL CA SCREEN DOC REV: CPT | Performed by: STUDENT IN AN ORGANIZED HEALTH CARE EDUCATION/TRAINING PROGRAM

## 2023-11-09 PROCEDURE — 1036F TOBACCO NON-USER: CPT | Performed by: STUDENT IN AN ORGANIZED HEALTH CARE EDUCATION/TRAINING PROGRAM

## 2023-11-09 RX ORDER — POLYETHYLENE GLYCOL 3350 17 G/17G
17 POWDER, FOR SOLUTION ORAL DAILY
Qty: 1530 G | Refills: 1 | Status: SHIPPED | OUTPATIENT
Start: 2023-11-09 | End: 2023-12-09

## 2023-11-09 NOTE — PROGRESS NOTES
[address] [facility]  Department of Family Medicine      Patient:  Negrita Vazquez 80 y.o. male     Date of Service: 11/9/23      Chief complaint:   Chief Complaint   Patient presents with    Abdominal Pain     lower    Constipation         History ofPresent Illness   The patient is a 80 y.o. male  presented to the clinic with complaints as above.     Abdominal pain  -f/u  -states his pain did resolve however is back  -having a lot of burping, indigestion and constipation   -has seen multiple specialists for this and they do not believe it is GI related   -has low back pain also     Past Medical History:      Diagnosis Date    Anxiety     GERD (gastroesophageal reflux disease)     Gout     Hyperlipidemia     Irritable bowel syndrome        PastSurgical History:        Procedure Laterality Date    SHOULDER SURGERY Right     shoulder replacment    TONSILLECTOMY         Allergies:    Gadolinium    Social History:   Social History     Socioeconomic History    Marital status:      Spouse name: Not on file    Number of children: Not on file    Years of education: Not on file    Highest education level: Not on file   Occupational History    Not on file   Tobacco Use    Smoking status: Never    Smokeless tobacco: Never   Vaping Use    Vaping Use: Never used   Substance and Sexual Activity    Alcohol use: Yes     Comment: rare    Drug use: No    Sexual activity: Yes     Partners: Female   Other Topics Concern    Not on file   Social History Narrative    Not on file     Social Determinants of Health     Financial Resource Strain: Low Risk  (6/1/2023)    Overall Financial Resource Strain (CARDIA)     Difficulty of Paying Living Expenses: Not very hard   Food Insecurity: No Food Insecurity (6/1/2023)    Hunger Vital Sign     Worried About Running Out of Food in the Last Year: Never true     Ran Out of Food in the Last Year: Never true   Transportation Needs: Unknown (6/1/2023)    [facility]

## 2023-11-16 ENCOUNTER — EVALUATION (OUTPATIENT)
Dept: PHYSICAL THERAPY | Age: 68
End: 2023-11-16

## 2023-11-16 DIAGNOSIS — M54.50 ACUTE BILATERAL LOW BACK PAIN WITHOUT SCIATICA: Primary | ICD-10-CM

## 2023-11-16 NOTE — PROGRESS NOTES
[address] [facility]   Phone: [phone]   Fax: [phone]      Physical Therapy Treatment Note    Date: 2023  Patient Name: Temo Diop  : 1955   MRN: [id number]  DOInjury: 1 year  DOSx: N/A  Referring Provider: Donna dillon,  [address] [facility] Diagnosis:     Low back pain    Outcome Measure: SPENCER    S: Please see eval  O:  Time 5919-1877     Visit  Repeat outcome measure at mid point and end. Pain See eval/10     Strength See eval     Palpation See eval     ROM      Modalities      MH + ES            Education      Posture, HEP, injury description, exercise rationale      Stretching      Supine HS stretch 20x 10 s holds  TE   Seated gastroc 20x 10 s holds  TE   IT band stretch 20x 10 s holds  TE           Functional activities To aid in reaching , pushing, pulling tasks at home     ROWS: H  \"    ROWS: M  \"    ROWS: L  \"    Obliques - high  \"    Obliques - low  \"     THEREX     Bike      Punches      Lat pulldowns      Triceps ext standing      Marching            Trunk ext TB      Trunk flex TB      Hip abd      Hip EXT      TG Squats            Lumbar extension      Prone press ups                        A:  Tolerated well. Above added to written HEP except IT band stretch d/t c/o groin pain.   P: Continue with rehab plan    Uday Merritt,  [address], [facility]  Treatment Charges: Mins Units   Initial Evaluation 15 1   Re-Evaluation     Ther Exercise         TE 25 2   Manual Therapy     MT     Ther Activities        TA     Gait Training          GT     Neuro Re-education NR     Modalities     Non-Billable Service Time     Other     Total Time/Units 40 3
tablet 0    EPINEPHrine (EPIPEN 2-LENY) 0.3 MG/0.3ML SOAJ injection Inject 0.3 mLs into the muscle once for 1 dose Use as directed for allergic reaction 0.3 mL 0    ketorolac (TORADOL) 10 MG tablet Take 1 tablet by mouth every 6 hours as needed for Pain Tolerated IM Toradol in the emergency room 20 tablet 0    febuxostat (ULORIC) 40 MG TABS tablet Take 1 tablet by mouth daily 30 tablet 5    hyoscyamine (LEVBID) 375 MCG extended release tablet Take 1 tablet by mouth every 12 hours as needed for Cramping       No current facility-administered medications for this visit. 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    Exercise regimen: jog, stepper, lift lightly, sit ups    Hobbies: none    Patient Goals: pain relief, return to prior activity, full use of leg, get back to normal, return to exercise regimen / fitness program, walk normally, walk better, improved balance, improved endurance    Contraindications/Precautions: none    OBJECTIVE:     Observations: well nourished male    Inspection: normal orthopedic exam, demonstrates generalized pronated posture (forward head, protracted scapula, internally rotated shoulders, and flexed trunk and lower extremities)       Range of Motion:    Trunk:    Flexion:  [] Normal   [x] Limited 50%   Extension:  [] Normal   [x] Limited 50% c pain    Right Rotation: [] Normal   [x] Limited 50% c pain   Left Rotation:  [] Normal   [x] Limited 50%   Right Side Bending: [] Normal   [x] Limited 50% c pain   Left Side Bending: [] Normal   [x] Limited 50%    Lower Extremity:   Right:   [x] Normal   [] Limited    Left:   [x] Normal   [] Limited       Strength:   R LE: 4/5   L LE: 5/5    Muscle Length: IT band: mild tightness BL, HS: moderate tightness BL    Palpation: Tender to palpation lower thoracic-upper lumbar spine, R piriformis muscle, upper thoracic musculature      Sensation: intact to light touch and temperature.     Special

## 2023-11-30 ENCOUNTER — TREATMENT (OUTPATIENT)
Dept: PHYSICAL THERAPY | Age: 68
End: 2023-11-30

## 2023-12-08 ENCOUNTER — OFFICE VISIT (OUTPATIENT)
Dept: PRIMARY CARE CLINIC | Age: 68
End: 2023-12-08
Payer: MEDICARE

## 2023-12-08 VITALS
OXYGEN SATURATION: 99 % | HEART RATE: 86 BPM | WEIGHT: 211 LBS | DIASTOLIC BLOOD PRESSURE: 92 MMHG | SYSTOLIC BLOOD PRESSURE: 132 MMHG | BODY MASS INDEX: 31.25 KG/M2 | HEIGHT: 69 IN | TEMPERATURE: 96.9 F | RESPIRATION RATE: 17 BRPM

## 2023-12-08 DIAGNOSIS — R10.32 LEFT GROIN PAIN: Primary | ICD-10-CM

## 2023-12-08 PROCEDURE — 99213 OFFICE O/P EST LOW 20 MIN: CPT | Performed by: STUDENT IN AN ORGANIZED HEALTH CARE EDUCATION/TRAINING PROGRAM

## 2023-12-08 PROCEDURE — 1123F ACP DISCUSS/DSCN MKR DOCD: CPT | Performed by: STUDENT IN AN ORGANIZED HEALTH CARE EDUCATION/TRAINING PROGRAM

## 2023-12-08 PROCEDURE — 3075F SYST BP GE 130 - 139MM HG: CPT | Performed by: STUDENT IN AN ORGANIZED HEALTH CARE EDUCATION/TRAINING PROGRAM

## 2023-12-08 PROCEDURE — 3080F DIAST BP >= 90 MM HG: CPT | Performed by: STUDENT IN AN ORGANIZED HEALTH CARE EDUCATION/TRAINING PROGRAM

## 2023-12-12 ENCOUNTER — TELEPHONE (OUTPATIENT)
Dept: PRIMARY CARE CLINIC | Age: 68
End: 2023-12-12

## 2023-12-13 ENCOUNTER — TELEPHONE (OUTPATIENT)
Dept: PRIMARY CARE CLINIC | Age: 68
End: 2023-12-13

## 2023-12-13 DIAGNOSIS — R10.32 LEFT GROIN PAIN: Primary | ICD-10-CM

## 2024-01-14 ENCOUNTER — APPOINTMENT (OUTPATIENT)
Dept: GENERAL RADIOLOGY | Age: 69
End: 2024-01-14
Payer: MEDICARE

## 2024-01-14 ENCOUNTER — HOSPITAL ENCOUNTER (EMERGENCY)
Age: 69
Discharge: HOME | End: 2024-01-14
Payer: MEDICARE

## 2024-01-14 ENCOUNTER — OFFICE VISIT (OUTPATIENT)
Dept: FAMILY MEDICINE CLINIC | Age: 69
End: 2024-01-14
Payer: MEDICARE

## 2024-01-14 ENCOUNTER — APPOINTMENT (OUTPATIENT)
Dept: CT IMAGING | Age: 69
End: 2024-01-14
Payer: MEDICARE

## 2024-01-14 VITALS
OXYGEN SATURATION: 96 % | HEIGHT: 69 IN | TEMPERATURE: 97.8 F | DIASTOLIC BLOOD PRESSURE: 92 MMHG | BODY MASS INDEX: 30.96 KG/M2 | RESPIRATION RATE: 18 BRPM | HEART RATE: 101 BPM | WEIGHT: 209 LBS | SYSTOLIC BLOOD PRESSURE: 154 MMHG

## 2024-01-14 VITALS
SYSTOLIC BLOOD PRESSURE: 182 MMHG | OXYGEN SATURATION: 98 % | RESPIRATION RATE: 16 BRPM | DIASTOLIC BLOOD PRESSURE: 100 MMHG | HEIGHT: 69 IN | WEIGHT: 205 LBS | BODY MASS INDEX: 30.36 KG/M2 | HEART RATE: 92 BPM | TEMPERATURE: 97.2 F

## 2024-01-14 DIAGNOSIS — S00.03XA HEMATOMA OF SCALP, INITIAL ENCOUNTER: ICD-10-CM

## 2024-01-14 DIAGNOSIS — M25.532 LEFT WRIST PAIN: ICD-10-CM

## 2024-01-14 DIAGNOSIS — S09.90XA INJURY OF HEAD, INITIAL ENCOUNTER: Primary | ICD-10-CM

## 2024-01-14 DIAGNOSIS — S62.115A CLOSED NONDISPLACED FRACTURE OF TRIQUETRUM OF LEFT WRIST, INITIAL ENCOUNTER: Primary | ICD-10-CM

## 2024-01-14 PROCEDURE — 73130 X-RAY EXAM OF HAND: CPT

## 2024-01-14 PROCEDURE — 1123F ACP DISCUSS/DSCN MKR DOCD: CPT | Performed by: NURSE PRACTITIONER

## 2024-01-14 PROCEDURE — 6370000000 HC RX 637 (ALT 250 FOR IP): Performed by: NURSE PRACTITIONER

## 2024-01-14 PROCEDURE — 99214 OFFICE O/P EST MOD 30 MIN: CPT | Performed by: NURSE PRACTITIONER

## 2024-01-14 PROCEDURE — 70450 CT HEAD/BRAIN W/O DYE: CPT

## 2024-01-14 PROCEDURE — 3080F DIAST BP >= 90 MM HG: CPT | Performed by: NURSE PRACTITIONER

## 2024-01-14 PROCEDURE — 3077F SYST BP >= 140 MM HG: CPT | Performed by: NURSE PRACTITIONER

## 2024-01-14 PROCEDURE — 99284 EMERGENCY DEPT VISIT MOD MDM: CPT

## 2024-01-14 PROCEDURE — 73110 X-RAY EXAM OF WRIST: CPT

## 2024-01-14 PROCEDURE — 72125 CT NECK SPINE W/O DYE: CPT

## 2024-01-14 PROCEDURE — 73502 X-RAY EXAM HIP UNI 2-3 VIEWS: CPT

## 2024-01-14 RX ORDER — HYDROCODONE BITARTRATE AND ACETAMINOPHEN 5; 325 MG/1; MG/1
1 TABLET ORAL ONCE
Status: DISCONTINUED | OUTPATIENT
Start: 2024-01-14 | End: 2024-01-14

## 2024-01-14 RX ORDER — IBUPROFEN 600 MG/1
600 TABLET ORAL EVERY 6 HOURS PRN
COMMUNITY

## 2024-01-14 RX ORDER — ACETAMINOPHEN 500 MG
1000 TABLET ORAL ONCE
Status: COMPLETED | OUTPATIENT
Start: 2024-01-14 | End: 2024-01-14

## 2024-01-14 RX ADMIN — ACETAMINOPHEN 1000 MG: 500 TABLET ORAL at 12:26

## 2024-01-14 ASSESSMENT — PAIN DESCRIPTION - ONSET: ONSET: SUDDEN

## 2024-01-14 ASSESSMENT — PAIN DESCRIPTION - FREQUENCY: FREQUENCY: CONTINUOUS

## 2024-01-14 ASSESSMENT — PAIN - FUNCTIONAL ASSESSMENT
PAIN_FUNCTIONAL_ASSESSMENT: PREVENTS OR INTERFERES SOME ACTIVE ACTIVITIES AND ADLS
PAIN_FUNCTIONAL_ASSESSMENT: 0-10

## 2024-01-14 ASSESSMENT — PAIN DESCRIPTION - ORIENTATION: ORIENTATION: LEFT

## 2024-01-14 ASSESSMENT — PAIN DESCRIPTION - LOCATION: LOCATION: WRIST

## 2024-01-14 ASSESSMENT — PAIN DESCRIPTION - DESCRIPTORS: DESCRIPTORS: THROBBING;ACHING

## 2024-01-14 ASSESSMENT — PAIN DESCRIPTION - PAIN TYPE: TYPE: ACUTE PAIN

## 2024-01-14 ASSESSMENT — PAIN SCALES - GENERAL: PAINLEVEL_OUTOF10: 8

## 2024-01-14 NOTE — ED PROVIDER NOTES
Independent MAURICIO Visit.      [facility]  Department of Emergency Medicine   ED  Encounter Note  Admit Date/RoomTime: 2024 11:32 AM  ED Room:     NAME: Akshat Steward  : 1955  MRN: [id number]     Chief Complaint:  Fall (tripped and fell yesterday, + head injury, left wrist injury, denies LOC, denies blod thinnners), Head Injury, and Wrist Injury    History of Present Illness       Akshat Steward is a 68 y.o. old male who presents to the emergency department by private vehicle, for a mechanical fall which occured 1 day(s) prior to arrival.  He reports that he was at the gym yesterday and on a stair climber, lost his balance, tripped and fell backwards.  He struck the back of his head off of a other piece of exercise equipment.  No LOC.  He is not on blood thinners.  He states he was evaluated at the gym, applied ice.  Today he went to urgent care and they recommended he came here for CT scan.  He denies headache, vision changes, nausea, vomiting, dizziness.  He has no external signs of trauma.  He is ambulatory.  ROS   Pertinent positives and negatives are stated within HPI, all other systems reviewed and are negative.    Past Medical History:  has a past medical history of Anxiety, GERD (gastroesophageal reflux disease), Gout, Hyperlipidemia, and Irritable bowel syndrome.    Surgical History:  has a past surgical history that includes Tonsillectomy and shoulder surgery (Right).    Social History:  reports that he has never smoked. He has never used smokeless tobacco. He reports current alcohol use. He reports that he does not use drugs.    Family History: family history includes Bone Cancer in his maternal grandfather; Dementia in his mother; Heart Disease in his father and mother; Kidney Disease in his father; No Known Problems in his brother and sister.     Allergies: Gadolinium    Physical Exam   Oxygen Saturation Interpretation: Normal.        ED Triage Vitals [24

## 2024-01-14 NOTE — DISCHARGE INSTRUCTIONS
KEEP THE WRIST SPLINT ON UNTIL YOU ARE EVALUATED BY ORTHOPEDICS  CALL ORTHOPEDICS ON TUESDAY MORNING.  ALTERNATE TYLENOL AND IBUPROFEN FOR PAIN.  ICE, ELEVATE.  RETURN FOR WORSENING SYMPTOMS.

## 2024-01-15 ENCOUNTER — TELEPHONE (OUTPATIENT)
Dept: PRIMARY CARE CLINIC | Age: 69
End: 2024-01-15

## 2024-01-15 NOTE — TELEPHONE ENCOUNTER
Pt r/c lisa call about making an ED f/u appt     Patient states he is feeling fine and doesn't want it

## 2024-02-07 ENCOUNTER — OFFICE VISIT (OUTPATIENT)
Dept: PRIMARY CARE CLINIC | Age: 69
End: 2024-02-07
Payer: MEDICARE

## 2024-02-07 VITALS
BODY MASS INDEX: 30.36 KG/M2 | OXYGEN SATURATION: 98 % | HEIGHT: 69 IN | SYSTOLIC BLOOD PRESSURE: 130 MMHG | WEIGHT: 205 LBS | DIASTOLIC BLOOD PRESSURE: 80 MMHG | RESPIRATION RATE: 18 BRPM | HEART RATE: 78 BPM | TEMPERATURE: 96.8 F

## 2024-02-07 DIAGNOSIS — R10.32 LEFT GROIN PAIN: Primary | ICD-10-CM

## 2024-02-07 PROCEDURE — 3075F SYST BP GE 130 - 139MM HG: CPT | Performed by: STUDENT IN AN ORGANIZED HEALTH CARE EDUCATION/TRAINING PROGRAM

## 2024-02-07 PROCEDURE — 3079F DIAST BP 80-89 MM HG: CPT | Performed by: STUDENT IN AN ORGANIZED HEALTH CARE EDUCATION/TRAINING PROGRAM

## 2024-02-07 PROCEDURE — 1123F ACP DISCUSS/DSCN MKR DOCD: CPT | Performed by: STUDENT IN AN ORGANIZED HEALTH CARE EDUCATION/TRAINING PROGRAM

## 2024-02-07 PROCEDURE — 99213 OFFICE O/P EST LOW 20 MIN: CPT | Performed by: STUDENT IN AN ORGANIZED HEALTH CARE EDUCATION/TRAINING PROGRAM

## 2024-02-07 ASSESSMENT — PATIENT HEALTH QUESTIONNAIRE - PHQ9
SUM OF ALL RESPONSES TO PHQ QUESTIONS 1-9: 0
2. FEELING DOWN, DEPRESSED OR HOPELESS: 0
SUM OF ALL RESPONSES TO PHQ QUESTIONS 1-9: 0
SUM OF ALL RESPONSES TO PHQ9 QUESTIONS 1 & 2: 0
1. LITTLE INTEREST OR PLEASURE IN DOING THINGS: 0

## 2024-02-07 NOTE — PROGRESS NOTES
[facility]  Department of Family Medicine      Patient:  Akshat Steward 68 y.o. male     Date of Service: 2/7/24      Chief complaint:   Chief Complaint   Patient presents with    Lower Back Pain         History ofPresent Illness   The patient is a 68 y.o. male  presented to the clinic with complaints as above.    Low back and groin pain  -started a month ago after he fell on his wrist and broke his wrist  -chronic pain, worsened with his fall  -having left sided groin pain     Broke wrist, left hand - seeing ortho at 1030, is in brace     Past Medical History:      Diagnosis Date    Anxiety     GERD (gastroesophageal reflux disease)     Gout     Hyperlipidemia     Irritable bowel syndrome        PastSurgical History:        Procedure Laterality Date    SHOULDER SURGERY Right     shoulder replacment    TONSILLECTOMY         Allergies:    Gadolinium    Social History:   Social History     Socioeconomic History    Marital status:      Spouse name: Not on file    Number of children: Not on file    Years of education: Not on file    Highest education level: Not on file   Occupational History    Not on file   Tobacco Use    Smoking status: Never    Smokeless tobacco: Never   Vaping Use    Vaping Use: Never used   Substance and Sexual Activity    Alcohol use: Yes     Comment: rare    Drug use: No    Sexual activity: Yes     Partners: Female   Other Topics Concern    Not on file   Social History Narrative    Not on file     Social Determinants of Health     Financial Resource Strain: Low Risk  (6/1/2023)    Overall Financial Resource Strain (CARDIA)     Difficulty of Paying Living Expenses: Not very hard   Food Insecurity: Not on file (6/1/2023)   Transportation Needs: Unknown (6/1/2023)    PRAPARE - Transportation     Lack of Transportation (Medical): Not on file     Lack of Transportation (Non-Medical): No   Physical Activity: Insufficiently Active (8/3/2023)    Exercise Vital Sign

## 2024-02-14 ENCOUNTER — HOSPITAL ENCOUNTER (OUTPATIENT)
Dept: ULTRASOUND IMAGING | Age: 69
Discharge: HOME | End: 2024-02-16
Attending: STUDENT IN AN ORGANIZED HEALTH CARE EDUCATION/TRAINING PROGRAM
Payer: MEDICARE

## 2024-02-14 ENCOUNTER — OFFICE VISIT (OUTPATIENT)
Dept: FAMILY MEDICINE CLINIC | Age: 69
End: 2024-02-14
Payer: MEDICARE

## 2024-02-14 VITALS
HEART RATE: 74 BPM | TEMPERATURE: 98.1 F | DIASTOLIC BLOOD PRESSURE: 92 MMHG | HEIGHT: 69 IN | SYSTOLIC BLOOD PRESSURE: 138 MMHG | BODY MASS INDEX: 31.25 KG/M2 | WEIGHT: 211 LBS | OXYGEN SATURATION: 97 %

## 2024-02-14 DIAGNOSIS — M54.50 ACUTE RIGHT-SIDED LOW BACK PAIN WITHOUT SCIATICA: Primary | ICD-10-CM

## 2024-02-14 DIAGNOSIS — R10.32 LEFT GROIN PAIN: ICD-10-CM

## 2024-02-14 LAB
BILIRUBIN, POC: NORMAL
BLOOD URINE, POC: NORMAL
CLARITY, POC: CLEAR
COLOR, POC: YELLOW
GLUCOSE URINE, POC: NORMAL
KETONES, POC: NORMAL
LEUKOCYTE EST, POC: NORMAL
NITRITE, POC: NORMAL
PH, POC: 5.5
PROTEIN, POC: NORMAL
SPECIFIC GRAVITY, POC: 1.02
UROBILINOGEN, POC: 0.2

## 2024-02-14 PROCEDURE — 99214 OFFICE O/P EST MOD 30 MIN: CPT | Performed by: PHYSICIAN ASSISTANT

## 2024-02-14 PROCEDURE — 1123F ACP DISCUSS/DSCN MKR DOCD: CPT | Performed by: PHYSICIAN ASSISTANT

## 2024-02-14 PROCEDURE — 3080F DIAST BP >= 90 MM HG: CPT | Performed by: PHYSICIAN ASSISTANT

## 2024-02-14 PROCEDURE — 3075F SYST BP GE 130 - 139MM HG: CPT | Performed by: PHYSICIAN ASSISTANT

## 2024-02-14 PROCEDURE — 76705 ECHO EXAM OF ABDOMEN: CPT

## 2024-02-14 PROCEDURE — 81002 URINALYSIS NONAUTO W/O SCOPE: CPT | Performed by: PHYSICIAN ASSISTANT

## 2024-02-14 NOTE — PROGRESS NOTES
24  Akshat Steward : 1955 Sex: male  Age 68 y.o.      Subjective:  Chief Complaint   Patient presents with    Lower Back Pain    Groin Pain     Had US at [place] today.   Fell and thinks it is back related. Requesting back Xray          HPI:   HPI  Akshat Steward , 68 y.o. male presents to [facility] care for evaluation of left back pain, groin pain.  The patient has had the symptoms ongoing since a fall on 2024.  The patient had gone to the emergency department and was evaluated.  The patient had been diagnosed with a broken left hand.  The patient had followed up with PCP and ordered a stat ultrasound.  That is pending at this time.    The patient has had this ongoing groin pain.  The patient was inquiring because that he did not have x-rays performed of the back area.        2024  The patient is a 68 y.o. male  presented to the clinic with complaints as above.     Low back and groin pain  -started a month ago after he fell on his wrist and broke his wrist  -chronic pain, worsened with his fall  -having left sided groin pain      Broke wrist, left hand - seeing ortho at 1030, is in brace     1. Left groin pain  Recurring issue  -Unclear etiology, concerns for hernia vs other, will other will get US to further evaluate   - US ABDOMEN LIMITED; Future      2024  Akshat Steward is a 68 y.o. old male who presents to the emergency department by private vehicle, for a mechanical fall which occured 1 day(s) prior to arrival.  He reports that he was at the gym yesterday and on a stair climber, lost his balance, tripped and fell backwards.  He struck the back of his head off of a other piece of exercise equipment.  No LOC.  He is not on blood thinners.  He states he was evaluated at the gym, applied ice.  Today he went to urgent care and they recommended he came here for CT scan.  He denies headache, vision changes, nausea, vomiting, dizziness.  He has no external signs of trauma.  He is

## 2024-02-15 ENCOUNTER — OFFICE VISIT (OUTPATIENT)
Dept: SURGERY | Age: 69
End: 2024-02-15
Payer: MEDICARE

## 2024-02-15 VITALS
OXYGEN SATURATION: 94 % | BODY MASS INDEX: 31.4 KG/M2 | HEIGHT: 69 IN | SYSTOLIC BLOOD PRESSURE: 165 MMHG | TEMPERATURE: 97 F | WEIGHT: 212 LBS | HEART RATE: 68 BPM | DIASTOLIC BLOOD PRESSURE: 94 MMHG

## 2024-02-15 DIAGNOSIS — R10.32 LEFT GROIN PAIN: Primary | ICD-10-CM

## 2024-02-15 DIAGNOSIS — R10.9 CHRONIC ABDOMINAL PAIN: ICD-10-CM

## 2024-02-15 DIAGNOSIS — G89.29 CHRONIC ABDOMINAL PAIN: ICD-10-CM

## 2024-02-15 DIAGNOSIS — R10.32 INGUINODYNIA, LEFT: ICD-10-CM

## 2024-02-15 DIAGNOSIS — K58.9 IRRITABLE BOWEL SYNDROME, UNSPECIFIED TYPE: Primary | ICD-10-CM

## 2024-02-15 PROCEDURE — 3080F DIAST BP >= 90 MM HG: CPT | Performed by: SURGERY

## 2024-02-15 PROCEDURE — 3077F SYST BP >= 140 MM HG: CPT | Performed by: SURGERY

## 2024-02-15 PROCEDURE — 99203 OFFICE O/P NEW LOW 30 MIN: CPT | Performed by: SURGERY

## 2024-02-15 PROCEDURE — 1123F ACP DISCUSS/DSCN MKR DOCD: CPT | Performed by: SURGERY

## 2024-02-28 NOTE — PROGRESS NOTES
[facility]   Phone: [phone]   Fax: [phone]    Discharge Summary        REASON FOR DISCHARGE: Pt cx final session on 11/30/2023 and no additional sessions scheduled. D/c d/t lapse of care.    PATIENT EDUCATION/INSTRUCTIONS: continue HEP as instructed    RECOMMENDATIONS: call c questions or if additional services are warranted.      Thank you for the opportunity to work with your patient. If you have questions or comments, please contact me at numbers listed above.      Yousif Elise, PT PT , DPT PT [id number] 2/28/2024

## 2024-03-26 ENCOUNTER — INITIAL CONSULT (OUTPATIENT)
Dept: GASTROENTEROLOGY | Age: 69
End: 2024-03-26
Payer: MEDICARE

## 2024-03-26 VITALS
BODY MASS INDEX: 31.55 KG/M2 | OXYGEN SATURATION: 99 % | HEIGHT: 69 IN | RESPIRATION RATE: 16 BRPM | SYSTOLIC BLOOD PRESSURE: 138 MMHG | WEIGHT: 213 LBS | TEMPERATURE: 97 F | DIASTOLIC BLOOD PRESSURE: 86 MMHG | HEART RATE: 61 BPM

## 2024-03-26 DIAGNOSIS — R10.30 LOWER ABDOMINAL PAIN: Primary | ICD-10-CM

## 2024-03-26 DIAGNOSIS — K30 INDIGESTION: ICD-10-CM

## 2024-03-26 DIAGNOSIS — R14.2 BELCHING: ICD-10-CM

## 2024-03-26 PROCEDURE — 1123F ACP DISCUSS/DSCN MKR DOCD: CPT | Performed by: NURSE PRACTITIONER

## 2024-03-26 PROCEDURE — 3075F SYST BP GE 130 - 139MM HG: CPT | Performed by: NURSE PRACTITIONER

## 2024-03-26 PROCEDURE — 99202 OFFICE O/P NEW SF 15 MIN: CPT | Performed by: NURSE PRACTITIONER

## 2024-03-26 PROCEDURE — 3079F DIAST BP 80-89 MM HG: CPT | Performed by: NURSE PRACTITIONER

## 2024-03-26 RX ORDER — PLECANATIDE 3 MG/1
3 TABLET ORAL DAILY
Qty: 30 TABLET | Refills: 5 | Status: SHIPPED | OUTPATIENT
Start: 2024-03-26

## 2024-03-26 NOTE — PROGRESS NOTES
Akshat Steward (:  1955) is a 68 y.o. male, here for evaluation of the following chief complaint(s):  New Patient (np referred by dr watkins for K58.9 (ICD-10-CM) Irritable bowel syndrome, unspecified type)      SUBJECTIVE/OBJECTIVE:  HPI:    Akshat is a very pleasant 68 year old gentleman that presents today for IBS    Patient tells me that he has been evaluated by multiple GI practices for lower abdominal cramping that radiates to his back  There is left groin pain that is intermittent  Followed with ortho for low back pain; diagnosed with DJD  There is a cyst next to his bladder, not a cause of abdominal pain  Colonoscopy in 2023 with Dr. Wright was unremarkable    As of recent, the patients bowel pattern is irregular, constipation  There is belching and indigestion  Patient tried Linzess but it caused abdominal pain    Complains of lower abdominal pain and low back pain that is constant   It waxes and wanes  The pain is worse in the morning  Never feels fully evacuated  Miralax causes bloating but does not improve constipation  Tried Dulcolax that satisfied moderately  Celiacs testing is negative    There is frequent belching   No weight loss, fevers, changes in appetite, dysphagia, odynophagia  Patient had an upper GI at [facility], I will attempt a copy    Patient has been in ER 2 to 3 times for the above complaints  Upon direct questioning, the patient has taken most PPI medications along with H2 blockers without improvement  Has tried bentyl and hyoscyamine without improvement in the cramping          ROS:  General: Patient denies n/v/f/c or weight loss.  HEENT: Patient denies persistent postnasal drip, scleral icterus, drooling, persistent bleeding from nose/mouth.  Resp: Patient denies SOB, wheezing, productive cough.  Cards: Patient denies CP, palpitations, significant edema  GI: As above.  Derm: Patient denies jaundice/rashes.   Musc: Patient denies diffuse/irregular joint

## 2024-03-27 ENCOUNTER — TELEPHONE (OUTPATIENT)
Age: 69
End: 2024-03-27

## 2024-03-27 NOTE — TELEPHONE ENCOUNTER
Patient Trulance medication is not covered by his insurance. He failed Linzess.    Lactulose is always a med that would be covered. Electronically signed by Chela Smith LPN on 3/27/2024 at 8:49 AM

## 2024-03-28 RX ORDER — LACTULOSE 10 G/15ML
10 SOLUTION ORAL EVERY EVENING
Qty: 473 ML | Refills: 1 | Status: SHIPPED | OUTPATIENT
Start: 2024-03-28

## 2024-04-10 ENCOUNTER — OFFICE VISIT (OUTPATIENT)
Dept: PRIMARY CARE CLINIC | Age: 69
End: 2024-04-10

## 2024-04-10 ENCOUNTER — TELEPHONE (OUTPATIENT)
Dept: PRIMARY CARE CLINIC | Age: 69
End: 2024-04-10

## 2024-04-10 VITALS
WEIGHT: 205 LBS | RESPIRATION RATE: 16 BRPM | TEMPERATURE: 96.9 F | SYSTOLIC BLOOD PRESSURE: 130 MMHG | BODY MASS INDEX: 30.36 KG/M2 | DIASTOLIC BLOOD PRESSURE: 90 MMHG | HEART RATE: 64 BPM | HEIGHT: 69 IN | OXYGEN SATURATION: 96 %

## 2024-04-10 DIAGNOSIS — J02.9 SORE THROAT: ICD-10-CM

## 2024-04-10 DIAGNOSIS — J06.9 VIRAL URI: Primary | ICD-10-CM

## 2024-04-10 LAB — S PYO AG THROAT QL: NORMAL

## 2024-04-10 RX ORDER — BENZONATATE 100 MG/1
100 CAPSULE ORAL 2 TIMES DAILY PRN
Qty: 20 CAPSULE | Refills: 0 | Status: SHIPPED | OUTPATIENT
Start: 2024-04-10 | End: 2024-04-17

## 2024-04-10 RX ORDER — LORATADINE 10 MG/1
10 TABLET ORAL DAILY
Qty: 30 TABLET | Refills: 0 | Status: SHIPPED | OUTPATIENT
Start: 2024-04-10

## 2024-04-10 NOTE — PROGRESS NOTES
tablet Take 1 tablet by mouth daily 30 tablet 5     No current facility-administered medications for this visit.        Mark Gonzalez, DO       This document may have been prepared at least partially through the use of voice recognition software. Although effort is taken to assure the accuracy ofthis document, it is possible that grammatical, syntax,  or spelling errors may occur.

## 2024-04-10 NOTE — TELEPHONE ENCOUNTER
No current facility-administered medications on file prior to encounter.      Current Outpatient Medications on File Prior to Encounter   Medication Sig    albuterol (PROAIR HFA) 90 mcg/actuation inhaler INHALE TWO PUFFS EVERY 4 TO 6 HOURS AS NEEDED    amlodipine (NORVASC) 5 MG tablet Take 5 mg by mouth 2 (two) times daily.    aspirin 325 MG tablet Take 1 tablet (325 mg total) by mouth once daily.    calcium acetate (PHOSLO) 667 mg capsule Take 1 capsule (667 mg total) by mouth 3 (three) times daily with meals.    hydrALAZINE (APRESOLINE) 50 MG tablet Take 50 mg by mouth 3 (three) times daily.    levetiracetam (KEPPRA) 500 MG Tab Take 500 mg twice a day.  Take an extra tablet right after dialysis (making 3 tablets on your dialysis days)    lisinopril (PRINIVIL,ZESTRIL) 40 MG tablet Take 1 tablet (40 mg total) by mouth once daily.    methadone (DOLOPHINE) 10 MG tablet Take 9 tablets (90 mg total) by mouth once daily.    metoclopramide HCl (REGLAN) 10 MG tablet Take 1 tablet (10 mg total) by mouth 3 (three) times daily as needed (nausea/vomiting).    minoxidil (LONITEN) 2.5 MG tablet Take 3 tablets (7.5 mg total) by mouth 2 (two) times daily.       Review of patient's allergies indicates:   Allergen Reactions    Antibiotic hc      Counter acts with methodone       Past Medical History:   Diagnosis Date    Anticoagulant long-term use     Arthritis     Asthma     Back pain     Coronary artery disease 2013    stent    Diabetes mellitus     Encounter for blood transfusion     Eye abnormality right eye    injured as a child    Gastritis     Gastroparesis     Hemodialysis patient     Hypertension     Pneumonia 2013    Spend 6weeks in hospital at [place]    Renal disorder     Stroke      Past Surgical History:   Procedure Laterality Date    AV FISTULA PLACEMENT      BACK SURGERY      CARDIAC SURGERY  2013    heart stent    CHOLECYSTECTOMY      EYE SURGERY      R eye     Family History      Ok to double book.    Thank You,  Mark Gonzalez     Problem Relation (Age of Onset)    Aneurysm Mother, Father (77)    Diabetes Brother    Heart disease Father, Paternal Grandmother    Kidney disease Brother        Tobacco Use    Smoking status: Former Smoker     Years: 10.00     Last attempt to quit: 9/1/2015     Years since quitting: 3.0    Smokeless tobacco: Never Used   Substance and Sexual Activity    Alcohol use: No    Drug use: Yes     Frequency: 4.0 times per week     Types: Other-see comments     Comment: marijuana    Sexual activity: Yes     Review of Systems   Eyes: Positive for visual disturbance.   Gastrointestinal: Negative for abdominal distention and abdominal pain.   Skin: Positive for color change and wound.   Hematological: Negative for adenopathy. Bruises/bleeds easily.     Objective:     Vital Signs (Most Recent):  Temp: 97.7 °F (36.5 °C) (09/06/18 1215)  Pulse: 77 (09/06/18 1215)  Resp: 18 (09/06/18 1215)  BP: (!) 173/81 (09/06/18 1215)  SpO2: 99 %(RA) (09/06/18 1215) Vital Signs (24h Range):  Temp:  [97.6 °F (36.4 °C)-98.3 °F (36.8 °C)] 97.7 °F (36.5 °C)  Pulse:  [72-77] 77  Resp:  [16-20] 18  SpO2:  [94 %-99 %] 99 %  BP: (129-173)/(62-81) 173/81     Weight: 92.9 kg (204 lb 12.9 oz)  Body mass index is 27.78 kg/m².    Physical Exam   Constitutional: No distress.   HENT:   Head: Normocephalic and atraumatic.   Eyes: No scleral icterus.   Neck: Normal range of motion. Neck supple. No tracheal deviation present. No thyromegaly present.   Cardiovascular: Normal rate and regular rhythm.   Pulmonary/Chest: No stridor. No respiratory distress.   Abdominal: He exhibits no distension. There is no tenderness.   Musculoskeletal: He exhibits no edema, tenderness or deformity.   Skin: He is not diaphoretic.        Vitals reviewed.      Significant Labs:  CBC:   Recent Labs   Lab  09/06/18   0520   WBC  8.20   RBC  2.86*   HGB  9.1*   HCT  27.4*   PLT  186   MCV  96   MCH  31.8*   MCHC  33.3     BMP:   Recent Labs   Lab  09/06/18   0520   GLU  61*   NA   134*   K  4.6   CL  100   CO2  21*   BUN  59*   CREATININE  7.1*   CALCIUM  7.6*       Significant Diagnostics:

## 2024-04-10 NOTE — TELEPHONE ENCOUNTER
Patient called in complains of sore throat for 1.5 days. Wanted to be seen today but there are no openings. Please advise.

## 2024-04-11 ENCOUNTER — OFFICE VISIT (OUTPATIENT)
Dept: FAMILY MEDICINE CLINIC | Age: 69
End: 2024-04-11
Payer: MEDICARE

## 2024-04-11 VITALS
OXYGEN SATURATION: 97 % | HEART RATE: 71 BPM | BODY MASS INDEX: 31.51 KG/M2 | WEIGHT: 213.4 LBS | TEMPERATURE: 97.2 F | DIASTOLIC BLOOD PRESSURE: 84 MMHG | SYSTOLIC BLOOD PRESSURE: 130 MMHG

## 2024-04-11 DIAGNOSIS — J01.40 ACUTE NON-RECURRENT PANSINUSITIS: ICD-10-CM

## 2024-04-11 DIAGNOSIS — J02.9 SORE THROAT: Primary | ICD-10-CM

## 2024-04-11 DIAGNOSIS — J34.89 SINUS PRESSURE: ICD-10-CM

## 2024-04-11 PROCEDURE — 3075F SYST BP GE 130 - 139MM HG: CPT | Performed by: EMERGENCY MEDICINE

## 2024-04-11 PROCEDURE — 96372 THER/PROPH/DIAG INJ SC/IM: CPT | Performed by: EMERGENCY MEDICINE

## 2024-04-11 PROCEDURE — 99214 OFFICE O/P EST MOD 30 MIN: CPT | Performed by: EMERGENCY MEDICINE

## 2024-04-11 PROCEDURE — 3079F DIAST BP 80-89 MM HG: CPT | Performed by: EMERGENCY MEDICINE

## 2024-04-11 PROCEDURE — 1123F ACP DISCUSS/DSCN MKR DOCD: CPT | Performed by: EMERGENCY MEDICINE

## 2024-04-11 RX ORDER — AZITHROMYCIN 250 MG/1
TABLET, FILM COATED ORAL
Qty: 6 TABLET | Refills: 0 | Status: SHIPPED | OUTPATIENT
Start: 2024-04-11 | End: 2024-04-21

## 2024-04-11 RX ORDER — TRIAMCINOLONE ACETONIDE 40 MG/ML
40 INJECTION, SUSPENSION INTRA-ARTICULAR; INTRAMUSCULAR ONCE
Status: COMPLETED | OUTPATIENT
Start: 2024-04-11 | End: 2024-04-11

## 2024-04-11 RX ADMIN — TRIAMCINOLONE ACETONIDE 40 MG: 40 INJECTION, SUSPENSION INTRA-ARTICULAR; INTRAMUSCULAR at 12:37

## 2024-04-11 ASSESSMENT — ENCOUNTER SYMPTOMS
COUGH: 0
EYE PAIN: 0
EYE DISCHARGE: 0
DIARRHEA: 0
WHEEZING: 0
SINUS PRESSURE: 1
BACK PAIN: 0
EYE REDNESS: 0
SORE THROAT: 1
NAUSEA: 0
SHORTNESS OF BREATH: 0
VOMITING: 0
ABDOMINAL PAIN: 0

## 2024-04-11 NOTE — PROGRESS NOTES
Chief Complaint:   Sore Throat (Started 2 days ago) and Congestion      History of Present Illness   HPI:  Akshat Steward is a 68 y.o. male who presents to [facility] today for seen by PCP 4/10/24. Placed on Claritan and Tessalon; sinus pressure worse today, requesting re-eval.    Prior to Visit Medications    Medication Sig Taking? Authorizing Provider   azithromycin (ZITHROMAX) 250 MG tablet 500mg on day 1 followed by 250mg on days 2 - 5 Yes Tino Weinstein, DO   loratadine (CLARITIN) 10 MG tablet Take 1 tablet by mouth daily  Mark Gonzalez, DO   benzonatate (TESSALON) 100 MG capsule Take 1 capsule by mouth 2 times daily as needed for Cough  Mark Gonzalez, DO   famotidine (PEPCID) 20 MG tablet Take 1 tablet by mouth 2 times daily  Mark Gonzalez, DO   febuxostat (ULORIC) 40 MG TABS tablet Take 1 tablet by mouth daily  Mark Gonzalez, DO       Review of Systems   Review of Systems   Constitutional:  Negative for chills and fever.   HENT:  Positive for congestion, sinus pressure and sore throat. Negative for ear pain.    Eyes:  Negative for pain, discharge and redness.   Respiratory:  Negative for cough, shortness of breath and wheezing.    Cardiovascular:  Negative for chest pain.   Gastrointestinal:  Negative for abdominal pain, diarrhea, nausea and vomiting.   Genitourinary:  Negative for dysuria and frequency.   Musculoskeletal:  Negative for arthralgias and back pain.   Skin:  Negative for rash and wound.   Neurological:  Negative for weakness and headaches.   Hematological:  Negative for adenopathy.   Psychiatric/Behavioral: Negative.     All other systems reviewed and are negative.      Patient's medical, social, and family history reviewed    Past Medical History:  has a past medical history of Anxiety, GERD (gastroesophageal reflux disease), Gout, Hyperlipidemia, and Irritable bowel syndrome.   Past Surgical History:  has a past surgical history that includes Tonsillectomy and

## 2024-05-01 ENCOUNTER — OFFICE VISIT (OUTPATIENT)
Dept: GASTROENTEROLOGY | Age: 69
End: 2024-05-01
Payer: MEDICARE

## 2024-05-01 VITALS
OXYGEN SATURATION: 98 % | TEMPERATURE: 97 F | BODY MASS INDEX: 30.66 KG/M2 | RESPIRATION RATE: 16 BRPM | SYSTOLIC BLOOD PRESSURE: 132 MMHG | HEIGHT: 69 IN | DIASTOLIC BLOOD PRESSURE: 84 MMHG | WEIGHT: 207 LBS | HEART RATE: 85 BPM

## 2024-05-01 DIAGNOSIS — R10.13 DYSPEPSIA: Primary | ICD-10-CM

## 2024-05-01 DIAGNOSIS — K58.8 OTHER IRRITABLE BOWEL SYNDROME: ICD-10-CM

## 2024-05-01 DIAGNOSIS — K21.9 GASTROESOPHAGEAL REFLUX DISEASE, UNSPECIFIED WHETHER ESOPHAGITIS PRESENT: ICD-10-CM

## 2024-05-01 PROCEDURE — 3075F SYST BP GE 130 - 139MM HG: CPT | Performed by: STUDENT IN AN ORGANIZED HEALTH CARE EDUCATION/TRAINING PROGRAM

## 2024-05-01 PROCEDURE — 1123F ACP DISCUSS/DSCN MKR DOCD: CPT | Performed by: STUDENT IN AN ORGANIZED HEALTH CARE EDUCATION/TRAINING PROGRAM

## 2024-05-01 PROCEDURE — 99213 OFFICE O/P EST LOW 20 MIN: CPT | Performed by: STUDENT IN AN ORGANIZED HEALTH CARE EDUCATION/TRAINING PROGRAM

## 2024-05-01 PROCEDURE — 3079F DIAST BP 80-89 MM HG: CPT | Performed by: STUDENT IN AN ORGANIZED HEALTH CARE EDUCATION/TRAINING PROGRAM

## 2024-05-01 RX ORDER — OMEPRAZOLE 40 MG/1
40 CAPSULE, DELAYED RELEASE ORAL DAILY
Qty: 30 CAPSULE | Refills: 11 | Status: SHIPPED | OUTPATIENT
Start: 2024-05-01

## 2024-05-01 RX ORDER — DICYCLOMINE HCL 20 MG
20 TABLET ORAL 4 TIMES DAILY PRN
Qty: 30 TABLET | Refills: 5 | Status: SHIPPED
Start: 2024-05-01 | End: 2024-05-17

## 2024-05-01 RX ORDER — POLYETHYLENE GLYCOL 3350 17 G/17G
17 POWDER, FOR SOLUTION ORAL DAILY
Qty: 1530 G | Refills: 5 | Status: SHIPPED
Start: 2024-05-01 | End: 2024-05-17

## 2024-05-01 RX ORDER — SUCRALFATE 1 G/1
1 TABLET ORAL 4 TIMES DAILY PRN
Qty: 30 TABLET | Refills: 11 | Status: SHIPPED
Start: 2024-05-01 | End: 2024-05-17

## 2024-05-01 NOTE — PATIENT INSTRUCTIONS
Learning About the Low FODMAP Diet for Irritable Bowel Syndrome (IBS)  What is the low-FODMAP diet?  A low-FODMAP diet is used to find out if certain foods make irritable bowel syndrome (IBS) worse. You stop eating high-FODMAP foods for 2 to 6 weeks. Then you slowly add them back to see how your body reacts.  This is called an elimination diet. A dietitian or doctor can help you follow this diet.  FODMAPs are types of carbohydrates that can be hard for your body to digest. They are in many types of foods. FODMAP stands for:  F ermentable.  O ligosaccharides.  D isaccharides.  M onosaccharides.  A nd p olyols.  If you have IBS, foods that are high in FODMAPs may make your symptoms worse. When you are on this diet, you can still eat carbohydrates that are low in FODMAPs. This includes certain fruits, vegetables, grains, and low-lactose dairy products.  What is it used for?  This diet is used to help manage symptoms of irritable bowel syndrome (IBS). The diet limits foods that are high in FODMAPs.  High-FODMAP foods can be hard to digest. They pull more fluid into your intestines. They are also easily fermented. This can lead to bloating, belly pain, gas, and diarrhea.  The low-FODMAP diet can help you figure out what foods to avoid. And it can help you find foods that are easier to digest.  This diet can help with IBS symptoms. But it's not a cure. You will still need to manage your condition.  How does it work?  At first, you won't eat any high-FODMAP foods for a few weeks.  It can be helpful to work with a dietitian who is trained in the low-FODMAP diet when you try this diet. They can help you find recipes and FODMAP food lists to use while you are on the diet.  After 2 to 6 weeks, you will start to try high-FODMAP foods again. You will add those foods back to your diet, one at a time. Your doctor or dietitian will probably have you wait a few days before you add each new food.  Keep a food diary. You can

## 2024-05-01 NOTE — PROGRESS NOTES
[facility]  Gastroenterology, Hepatology &  Advanced Endoscopy     Progress Note    SUBJECTIVE:      Mr. Akshat Steward is a 68y/M who presents to clinic to further discuss his symptoms of IBS/GERD. He describes episodes of lower abdominal pain which will radiate to his back. He also describes belching, constipation, and L inguinal pain. He had a colonoscopy in March 2023 with Dr. Wright which was negative for any acute inflammatory/ulcerative process. He takes Pepcid qPM PRN when reflux symptoms are present. He reports that in the months at the tail end of his mother's treatment course, symptoms were significantly worse given the stress of caretaking. Since her passing, he reports that symptoms have not been as severe. He reports low-volume stool though he is regular with daily bowel movements. He will have regular abdominal cramping and gas. He reports dyspepsia at times after eating. He has tried colace and Gas-Ex PRN thus far for symptom management.     OBJECTIVE      Physical    VITALS:  /84   Pulse 85   Temp 97 °F (36.1 °C) (Temporal)   Resp 16   Ht 1.753 m (5' 9\")   Wt 93.9 kg (207 lb)   SpO2 98%   BMI 30.57 kg/m²   Physical Exam:  General: Overall well-appearing, NAD  HEENT: PERRLA, EOMI, Anicteric sclera, MMM, no rhinorrhea  Cards: RRR, no LE edema  Resp: Breathing comfortably on room air, good air movement, no use of accessory muscles, no audible wheezing  Abdomen: soft, NT, ND.   Extremities: Moves all extremities, no effusions or bruising.  Skin: No rashes or jaundice  Neuro: A&O x 3, CN grossly intact, non-focal exam       ASSESSMENT AND PLAN      68y/M presents to clinic for continued evaluation and management of GERD and IBS.    PLAN:  -PPI daily  -Carafate PRN for dyspepsia  -Bentyl PRN for abdominal cramping  -Stool evacuation with magnesium citrate followed by daily Miralax to keep bowel movements regular and relieving.   - Low FODMAP  - If no improvement with the above plan, will

## 2024-05-15 ENCOUNTER — OFFICE VISIT (OUTPATIENT)
Dept: FAMILY MEDICINE CLINIC | Age: 69
End: 2024-05-15
Payer: MEDICARE

## 2024-05-15 ENCOUNTER — TELEPHONE (OUTPATIENT)
Dept: PRIMARY CARE CLINIC | Age: 69
End: 2024-05-15

## 2024-05-15 ENCOUNTER — TELEPHONE (OUTPATIENT)
Dept: FAMILY MEDICINE CLINIC | Age: 69
End: 2024-05-15

## 2024-05-15 VITALS
OXYGEN SATURATION: 99 % | DIASTOLIC BLOOD PRESSURE: 106 MMHG | BODY MASS INDEX: 30.45 KG/M2 | WEIGHT: 206.2 LBS | HEART RATE: 68 BPM | SYSTOLIC BLOOD PRESSURE: 158 MMHG | TEMPERATURE: 98.3 F

## 2024-05-15 DIAGNOSIS — R03.0 ELEVATED BLOOD PRESSURE READING: ICD-10-CM

## 2024-05-15 DIAGNOSIS — R51.9 NONINTRACTABLE HEADACHE, UNSPECIFIED CHRONICITY PATTERN, UNSPECIFIED HEADACHE TYPE: Primary | ICD-10-CM

## 2024-05-15 PROBLEM — R10.13 DYSPEPSIA: Status: ACTIVE | Noted: 2024-05-15

## 2024-05-15 PROBLEM — K21.9 GASTROESOPHAGEAL REFLUX DISEASE: Status: ACTIVE | Noted: 2024-05-15

## 2024-05-15 PROBLEM — K58.8 OTHER IRRITABLE BOWEL SYNDROME: Status: ACTIVE | Noted: 2024-05-15

## 2024-05-15 PROCEDURE — 3080F DIAST BP >= 90 MM HG: CPT | Performed by: PHYSICIAN ASSISTANT

## 2024-05-15 PROCEDURE — 99215 OFFICE O/P EST HI 40 MIN: CPT | Performed by: PHYSICIAN ASSISTANT

## 2024-05-15 PROCEDURE — 3077F SYST BP >= 140 MM HG: CPT | Performed by: PHYSICIAN ASSISTANT

## 2024-05-15 PROCEDURE — 1123F ACP DISCUSS/DSCN MKR DOCD: CPT | Performed by: PHYSICIAN ASSISTANT

## 2024-05-15 NOTE — TELEPHONE ENCOUNTER
Pt was seen thru walk in today and wanted to know if he can become a new patient?    When message has been addressed, please route message to office pool not to original sender.

## 2024-05-15 NOTE — PROGRESS NOTES
to follow-up with PCP in the next 2-3 days for repeat evaluation repeat assessment or go directly to the emergency department.     SIGNATURE: Shahriar Solis III, PA-C    Greater than 45 minutes spent evaluating the patient, assessing the patient, trying to obtain stat imaging, patient no-show, trying to contact the patient, discussing further treatment, risk of not treating

## 2024-05-15 NOTE — TELEPHONE ENCOUNTER
Spoke to pt regarding CT scan and why he did not have it done this afternoon.  Pt went for appointment and did not want to wait there because it was too busy so he left.  He wants to wait a couple of days to see if the symptoms resolve on its own.  He will call to reschedule if he decides to have the CT scan done.

## 2024-05-15 NOTE — TELEPHONE ENCOUNTER
I called the patient twice and left 1 voicemail.    His blood pressure is elevated.    I would like him to have the CT scan or go directly to the ED.    Also the blood pressure is elevated and concerned for the continued elevation of the blood pressure.  The patient does need to monitor the blood pressure and possibly stop medication.  The patient is amenable to the significance of her blood pressure medication.    I would have the patient contact his PCP and be seen in the next 1 to 2 days

## 2024-05-16 ENCOUNTER — HOSPITAL ENCOUNTER (OUTPATIENT)
Dept: CT IMAGING | Age: 69
Discharge: HOME | End: 2024-05-18
Payer: MEDICARE

## 2024-05-16 DIAGNOSIS — R51.9 NONINTRACTABLE HEADACHE, UNSPECIFIED CHRONICITY PATTERN, UNSPECIFIED HEADACHE TYPE: ICD-10-CM

## 2024-05-16 PROCEDURE — 70450 CT HEAD/BRAIN W/O DYE: CPT

## 2024-05-16 NOTE — PROGRESS NOTES
[facility]  Department of Family Medicine      Patient:  Akshat Steward 68 y.o. male     Date of Service: 5/17/24      Chief complaint:   Chief Complaint   Patient presents with    Results    Tinnitus         History ofPresent Illness   The patient is a 68 y.o. male  presented to the clinic with complaints as above.    Headache and elevated BP  -[facility] f/u  -this started Tuesday, went to  and ordered ct scan  -CT scan looked ok  -having some ear ringing, bilaterally  -denies any hearing loss or dizziness     Past Medical History:      Diagnosis Date    Anxiety     GERD (gastroesophageal reflux disease)     Gout     Hyperlipidemia     Irritable bowel syndrome        PastSurgical History:        Procedure Laterality Date    SHOULDER SURGERY Right     shoulder replacment    TONSILLECTOMY         Allergies:    Gadolinium    Social History:   Social History     Socioeconomic History    Marital status:      Spouse name: Not on file    Number of children: Not on file    Years of education: Not on file    Highest education level: Not on file   Occupational History    Not on file   Tobacco Use    Smoking status: Never    Smokeless tobacco: Never   Vaping Use    Vaping Use: Never used   Substance and Sexual Activity    Alcohol use: Yes     Comment: rare    Drug use: No    Sexual activity: Yes     Partners: Female   Other Topics Concern    Not on file   Social History Narrative    Not on file     Social Determinants of Health     Financial Resource Strain: Low Risk  (6/1/2023)    Overall Financial Resource Strain (CARDIA)     Difficulty of Paying Living Expenses: Not very hard   Food Insecurity: Not on file (6/1/2023)   Transportation Needs: Unknown (6/1/2023)    PRAPARE - Transportation     Lack of Transportation (Medical): Not on file     Lack of Transportation (Non-Medical): No   Physical Activity: Insufficiently Active (8/3/2023)    Exercise Vital Sign     Days of Exercise per Week: 4 days

## 2024-05-16 NOTE — TELEPHONE ENCOUNTER
Spoke to pt, notified him that Dr. Arnett is not accepting new pts at this time. Offered to ask another provide, pt declined. Encounter closed.

## 2024-05-16 NOTE — RESULT ENCOUNTER NOTE
CT scan did not show any evidence of acute intracranial process.    Can you have the patient please monitor blood pressure.  Appears she has a follow-up appointment with PCP tomorrow

## 2024-05-17 ENCOUNTER — OFFICE VISIT (OUTPATIENT)
Dept: PRIMARY CARE CLINIC | Age: 69
End: 2024-05-17

## 2024-05-17 VITALS
OXYGEN SATURATION: 98 % | RESPIRATION RATE: 18 BRPM | HEART RATE: 88 BPM | WEIGHT: 207 LBS | SYSTOLIC BLOOD PRESSURE: 126 MMHG | TEMPERATURE: 97 F | HEIGHT: 69 IN | BODY MASS INDEX: 30.66 KG/M2 | DIASTOLIC BLOOD PRESSURE: 82 MMHG

## 2024-05-17 DIAGNOSIS — J06.9 VIRAL URI: Primary | ICD-10-CM

## 2024-05-17 RX ORDER — FLUTICASONE PROPIONATE 50 MCG
1 SPRAY, SUSPENSION (ML) NASAL DAILY
Qty: 32 G | Refills: 1 | Status: SHIPPED | OUTPATIENT
Start: 2024-05-17

## 2024-05-22 ENCOUNTER — OFFICE VISIT (OUTPATIENT)
Dept: FAMILY MEDICINE CLINIC | Age: 69
End: 2024-05-22
Payer: MEDICARE

## 2024-05-22 VITALS
OXYGEN SATURATION: 98 % | TEMPERATURE: 98.2 F | HEART RATE: 80 BPM | SYSTOLIC BLOOD PRESSURE: 124 MMHG | WEIGHT: 207 LBS | DIASTOLIC BLOOD PRESSURE: 80 MMHG | BODY MASS INDEX: 30.57 KG/M2

## 2024-05-22 DIAGNOSIS — K59.00 CONSTIPATION, UNSPECIFIED CONSTIPATION TYPE: ICD-10-CM

## 2024-05-22 DIAGNOSIS — R10.9 RIGHT FLANK PAIN: Primary | ICD-10-CM

## 2024-05-22 LAB
BILIRUBIN, POC: NEGATIVE
BLOOD URINE, POC: NORMAL
CLARITY, POC: CLEAR
COLOR, POC: YELLOW
GLUCOSE URINE, POC: NEGATIVE
KETONES, POC: NEGATIVE
LEUKOCYTE EST, POC: NEGATIVE
NITRITE, POC: NEGATIVE
PH, POC: 5.5
PROTEIN, POC: NORMAL
SPECIFIC GRAVITY, POC: >=1.03
UROBILINOGEN, POC: 0.2

## 2024-05-22 PROCEDURE — 99214 OFFICE O/P EST MOD 30 MIN: CPT | Performed by: PHYSICIAN ASSISTANT

## 2024-05-22 PROCEDURE — 1123F ACP DISCUSS/DSCN MKR DOCD: CPT | Performed by: PHYSICIAN ASSISTANT

## 2024-05-22 PROCEDURE — 81002 URINALYSIS NONAUTO W/O SCOPE: CPT | Performed by: PHYSICIAN ASSISTANT

## 2024-05-22 PROCEDURE — 3079F DIAST BP 80-89 MM HG: CPT | Performed by: PHYSICIAN ASSISTANT

## 2024-05-22 PROCEDURE — 3074F SYST BP LT 130 MM HG: CPT | Performed by: PHYSICIAN ASSISTANT

## 2024-05-22 NOTE — PROGRESS NOTES
24  Akshat Steward : 1955 Sex: male  Age 68 y.o.      Subjective:  Chief Complaint   Patient presents with    Abdominal Pain    Gastroesophageal Reflux         HPI:   HPI  Akshat Steward , 68 y.o. male presents to express care for evaluation of right-sided abdominal pain.  The patient started with the symptoms over the last several days.  The patient states that he does have some chronic abdominal pains that he has been dealing with for the last year and a half.  The patient is gone to multiple specialist.  The patient does note some intermittent crampy pain.  The patient notes pain on the right side of the abdomen and occasionally into the back.  The patient does not have any history of kidney stones.  The patient has not any fevers, chills.  No hematuria.  The patient without any left-sided flank pain.        ROS:   Unless otherwise stated in this report the patient's positive and negative responses for review of systems for constitutional, eyes, ENT, cardiovascular, respiratory, gastrointestinal, neurological, , musculoskeletal, and integument systems and related systems to the presenting problem are either stated in the history of present illness or were not pertinent or were negative for the symptoms and/or complaints related to the presenting medical problem.  Positives and pertinent negatives as per HPI.  All others reviewed and are negative.      PMH:     Past Medical History:   Diagnosis Date    Anxiety     GERD (gastroesophageal reflux disease)     Gout     Hyperlipidemia     Irritable bowel syndrome        Past Surgical History:   Procedure Laterality Date    SHOULDER SURGERY Right     shoulder replacment    TONSILLECTOMY         Family History   Problem Relation Age of Onset    Heart Disease Mother     Dementia Mother     Heart Disease Father         heart valve replaced    Kidney Disease Father     No Known Problems Sister     No Known Problems Brother     Bone Cancer Maternal

## 2024-05-24 ENCOUNTER — TELEPHONE (OUTPATIENT)
Dept: PRIMARY CARE CLINIC | Age: 69
End: 2024-05-24

## 2024-05-24 ENCOUNTER — OFFICE VISIT (OUTPATIENT)
Dept: PRIMARY CARE CLINIC | Age: 69
End: 2024-05-24

## 2024-05-24 VITALS
SYSTOLIC BLOOD PRESSURE: 120 MMHG | WEIGHT: 207 LBS | OXYGEN SATURATION: 95 % | RESPIRATION RATE: 16 BRPM | HEART RATE: 76 BPM | DIASTOLIC BLOOD PRESSURE: 88 MMHG | HEIGHT: 69 IN | TEMPERATURE: 97.3 F | BODY MASS INDEX: 30.66 KG/M2

## 2024-05-24 DIAGNOSIS — G89.29 CHRONIC ABDOMINAL PAIN: Primary | ICD-10-CM

## 2024-05-24 DIAGNOSIS — I10 PRIMARY HYPERTENSION: ICD-10-CM

## 2024-05-24 DIAGNOSIS — R10.9 CHRONIC ABDOMINAL PAIN: Primary | ICD-10-CM

## 2024-05-24 NOTE — PROGRESS NOTES
Assessment and Plan       1. Chronic abdominal pain  F/u of chronic issue  -Worsening, still unclear etiology with unclear prognosis, will get stat imaging given RLQ pain and labs as below  - CT ABDOMEN PELVIS W WO CONTRAST Additional Contrast? None; Future  - Comprehensive Metabolic Panel  - CBC  - Lipase  - TSH; Future    2. Primary hypertension  F/u of chronic issue  -Diet controlled, will continue to monitor   - Comprehensive Metabolic Panel  - CBC  - Lipase  - TSH; Future    I am the primary care provider for this patient and provide the patient with continuity of care.       Counseled regarding above diagnosis, including possible risks and complications,  especially if left uncontrolled. Counseled regarding the possible side effects, risks, benefits and alternatives to treatment;patient and/or guardian verbalizes understanding, agrees, feels comfortable with and wishes to proceed with above treatment plan.    Call or go to EDimmediately if symptoms worsen or persist. Advised patient to call with any new medication issues, and, as applicable, read all Rx info from pharmacy to assure aware of all possible risks and side effects of medicationbefore taking.     Patient and/or guardian given opportunity to ask questions/raise concerns.  The patient verbalized comfort and understanding ofinstructions.    I encourage further reading and education about your health conditions.  Information on many health conditions is provided by the[facility] Academy of [employer]: [email or web address]/  Please bring any questions to me at your nextvisit.    Return to Office: Return if symptoms worsen or fail to improve.    Medication List:    Current Outpatient Medications   Medication Sig Dispense Refill    fluticasone (FLONASE) 50 MCG/ACT nasal spray 1 spray by Each Nostril route daily 32 g 1    febuxostat (ULORIC) 40 MG TABS tablet Take 1 tablet by mouth daily 30 tablet 5    omeprazole (PRILOSEC) 40 MG

## 2024-05-24 NOTE — TELEPHONE ENCOUNTER
Patient scheduled for labs on Tuesday in office. Will need stat ct ordered Wednesday morning to get done.

## 2024-05-24 NOTE — TELEPHONE ENCOUNTER
Pt went to get blood work at the hospital and it was an hour wait and he cannot wait he has another appointment he has to get to. He would like a call back on what he should do next. He can't make it to [facility] by 12:00.

## 2024-05-28 ENCOUNTER — NURSE ONLY (OUTPATIENT)
Dept: PRIMARY CARE CLINIC | Age: 69
End: 2024-05-28
Payer: MEDICARE

## 2024-05-28 DIAGNOSIS — G89.29 CHRONIC ABDOMINAL PAIN: ICD-10-CM

## 2024-05-28 DIAGNOSIS — R10.9 CHRONIC ABDOMINAL PAIN: ICD-10-CM

## 2024-05-28 DIAGNOSIS — I10 PRIMARY HYPERTENSION: ICD-10-CM

## 2024-05-28 LAB
ALBUMIN: 4.3 G/DL (ref 3.5–5.2)
ALP BLD-CCNC: 93 U/L (ref 40–129)
ALT SERPL-CCNC: 9 U/L (ref 0–40)
ANION GAP SERPL CALCULATED.3IONS-SCNC: 12 MMOL/L (ref 7–16)
AST SERPL-CCNC: 14 U/L (ref 0–39)
BILIRUB SERPL-MCNC: 0.4 MG/DL (ref 0–1.2)
BUN BLDV-MCNC: 19 MG/DL (ref 6–23)
CALCIUM SERPL-MCNC: 9 MG/DL (ref 8.6–10.2)
CHLORIDE BLD-SCNC: 105 MMOL/L (ref 98–107)
CO2: 24 MMOL/L (ref 22–29)
CREAT SERPL-MCNC: 1 MG/DL (ref 0.7–1.2)
GFR, ESTIMATED: 82 ML/MIN/1.73M2
GLUCOSE BLD-MCNC: 88 MG/DL (ref 74–99)
HCT VFR BLD CALC: 49 % (ref 37–54)
HEMOGLOBIN: 16.5 G/DL (ref 12.5–16.5)
LIPASE: 44 U/L (ref 13–60)
MCH RBC QN AUTO: 30.2 PG (ref 26–35)
MCHC RBC AUTO-ENTMCNC: 33.7 G/DL (ref 32–34.5)
MCV RBC AUTO: 89.7 FL (ref 80–99.9)
PDW BLD-RTO: 12.8 % (ref 11.5–15)
PLATELET # BLD: 275 K/UL (ref 130–450)
PMV BLD AUTO: 10.7 FL (ref 7–12)
POTASSIUM SERPL-SCNC: 4.3 MMOL/L (ref 3.5–5)
RBC # BLD: 5.46 M/UL (ref 3.8–5.8)
SODIUM BLD-SCNC: 141 MMOL/L (ref 132–146)
TOTAL PROTEIN: 7 G/DL (ref 6.4–8.3)
TSH SERPL DL<=0.05 MIU/L-ACNC: 2.45 UIU/ML (ref 0.27–4.2)
WBC # BLD: 6.4 K/UL (ref 4.5–11.5)

## 2024-05-28 PROCEDURE — 36415 COLL VENOUS BLD VENIPUNCTURE: CPT | Performed by: STUDENT IN AN ORGANIZED HEALTH CARE EDUCATION/TRAINING PROGRAM

## 2024-05-29 ENCOUNTER — HOSPITAL ENCOUNTER (OUTPATIENT)
Dept: CT IMAGING | Age: 69
Discharge: HOME | End: 2024-05-31
Payer: MEDICARE

## 2024-05-29 DIAGNOSIS — R10.9 CHRONIC ABDOMINAL PAIN: Primary | ICD-10-CM

## 2024-05-29 DIAGNOSIS — G89.29 CHRONIC ABDOMINAL PAIN: Primary | ICD-10-CM

## 2024-05-29 DIAGNOSIS — R10.9 CHRONIC ABDOMINAL PAIN: ICD-10-CM

## 2024-05-29 DIAGNOSIS — G89.29 CHRONIC ABDOMINAL PAIN: ICD-10-CM

## 2024-05-29 PROCEDURE — 74176 CT ABD & PELVIS W/O CONTRAST: CPT

## 2024-05-29 RX ORDER — SODIUM CHLORIDE 0.9 % (FLUSH) 0.9 %
5-40 SYRINGE (ML) INJECTION 2 TIMES DAILY
Status: DISCONTINUED | OUTPATIENT
Start: 2024-05-29 | End: 2024-05-29

## 2024-06-18 NOTE — PROGRESS NOTES
index is 30.57 kg/m². (!) Abnormal    Obesity Interventions:  Trying to work on this             Safety:  Do you have non-slip mats or non-slip surfaces or shower bars or grab bars in your shower or bathtub?: (!) No  Do you always fasten your seatbelt when you are in a car?: (!) No  Interventions:  Safety precautions advised     Advanced Directives:  Do you have a Living Will?: (!) No    Intervention:  Advised he get living will                Objective   Vitals:    06/19/24 1037 06/19/24 1049   BP: (!) 144/88 128/80   Pulse: 92    Resp: 17    Temp: 97.3 °F (36.3 °C)    TempSrc: Infrared    SpO2: 96%    Weight: 93.9 kg (207 lb)    Height: 1.753 m (5' 9\")       Body mass index is 30.57 kg/m².        General Appearance: alert and oriented to person, place and time, well-developed and well-nourished, in no acute distress  Pulmonary/Chest: clear to auscultation bilaterally- no wheezes, rales or rhonchi, normal air movement, no respiratory distress  Cardiovascular: normal rate and normal S1 and S2       Allergies   Allergen Reactions    Gadolinium Angioedema     Prior to Visit Medications    Medication Sig Taking? Authorizing Provider   febuxostat (ULORIC) 40 MG TABS tablet Take 1 tablet by mouth daily Yes Mark Gonzalez DO       CareTeam (Including outside providers/suppliers regularly involved in providing care):   Patient Care Team:  Mark Gonzalez DO as PCP - General (Family Medicine)  Mark Gonzalez DO as PCP - Empaneled Provider     Reviewed and updated this visit:  Tobacco  Allergies  Meds  Med Hx  Surg Hx  Soc Hx  Fam Hx

## 2024-06-19 ENCOUNTER — OFFICE VISIT (OUTPATIENT)
Dept: PRIMARY CARE CLINIC | Age: 69
End: 2024-06-19
Payer: MEDICARE

## 2024-06-19 VITALS
OXYGEN SATURATION: 96 % | DIASTOLIC BLOOD PRESSURE: 80 MMHG | TEMPERATURE: 97.3 F | HEIGHT: 69 IN | BODY MASS INDEX: 30.66 KG/M2 | SYSTOLIC BLOOD PRESSURE: 128 MMHG | RESPIRATION RATE: 17 BRPM | HEART RATE: 92 BPM | WEIGHT: 207 LBS

## 2024-06-19 DIAGNOSIS — Z12.5 PROSTATE CANCER SCREENING: ICD-10-CM

## 2024-06-19 DIAGNOSIS — Z00.00 MEDICARE ANNUAL WELLNESS VISIT, SUBSEQUENT: Primary | ICD-10-CM

## 2024-06-19 DIAGNOSIS — E78.5 HYPERLIPIDEMIA, UNSPECIFIED HYPERLIPIDEMIA TYPE: ICD-10-CM

## 2024-06-19 LAB
CHOLESTEROL, TOTAL: 214 MG/DL
HDLC SERPL-MCNC: 52 MG/DL
LDL CHOLESTEROL: 132 MG/DL
PROSTATE SPECIFIC ANTIGEN: 3.56 NG/ML (ref 0–4)
TRIGL SERPL-MCNC: 149 MG/DL
VLDLC SERPL CALC-MCNC: 30 MG/DL

## 2024-06-19 PROCEDURE — 3074F SYST BP LT 130 MM HG: CPT | Performed by: STUDENT IN AN ORGANIZED HEALTH CARE EDUCATION/TRAINING PROGRAM

## 2024-06-19 PROCEDURE — 1123F ACP DISCUSS/DSCN MKR DOCD: CPT | Performed by: STUDENT IN AN ORGANIZED HEALTH CARE EDUCATION/TRAINING PROGRAM

## 2024-06-19 PROCEDURE — 3079F DIAST BP 80-89 MM HG: CPT | Performed by: STUDENT IN AN ORGANIZED HEALTH CARE EDUCATION/TRAINING PROGRAM

## 2024-06-19 PROCEDURE — G0439 PPPS, SUBSEQ VISIT: HCPCS | Performed by: STUDENT IN AN ORGANIZED HEALTH CARE EDUCATION/TRAINING PROGRAM

## 2024-06-19 PROCEDURE — 36415 COLL VENOUS BLD VENIPUNCTURE: CPT | Performed by: STUDENT IN AN ORGANIZED HEALTH CARE EDUCATION/TRAINING PROGRAM

## 2024-06-19 SDOH — ECONOMIC STABILITY: FOOD INSECURITY: WITHIN THE PAST 12 MONTHS, THE FOOD YOU BOUGHT JUST DIDN'T LAST AND YOU DIDN'T HAVE MONEY TO GET MORE.: NEVER TRUE

## 2024-06-19 SDOH — ECONOMIC STABILITY: FOOD INSECURITY: WITHIN THE PAST 12 MONTHS, YOU WORRIED THAT YOUR FOOD WOULD RUN OUT BEFORE YOU GOT MONEY TO BUY MORE.: NEVER TRUE

## 2024-06-19 SDOH — ECONOMIC STABILITY: INCOME INSECURITY: HOW HARD IS IT FOR YOU TO PAY FOR THE VERY BASICS LIKE FOOD, HOUSING, MEDICAL CARE, AND HEATING?: SOMEWHAT HARD

## 2024-06-19 ASSESSMENT — PATIENT HEALTH QUESTIONNAIRE - PHQ9
SUM OF ALL RESPONSES TO PHQ QUESTIONS 1-9: 0
1. LITTLE INTEREST OR PLEASURE IN DOING THINGS: NOT AT ALL
SUM OF ALL RESPONSES TO PHQ QUESTIONS 1-9: 0
SUM OF ALL RESPONSES TO PHQ9 QUESTIONS 1 & 2: 0
2. FEELING DOWN, DEPRESSED OR HOPELESS: NOT AT ALL

## 2024-06-19 ASSESSMENT — LIFESTYLE VARIABLES
HOW MANY STANDARD DRINKS CONTAINING ALCOHOL DO YOU HAVE ON A TYPICAL DAY: 1 OR 2
HOW OFTEN DO YOU HAVE A DRINK CONTAINING ALCOHOL: MONTHLY OR LESS

## 2024-07-18 ENCOUNTER — OFFICE VISIT (OUTPATIENT)
Dept: FAMILY MEDICINE CLINIC | Age: 69
End: 2024-07-18
Payer: MEDICARE

## 2024-07-18 VITALS
OXYGEN SATURATION: 99 % | TEMPERATURE: 97.5 F | DIASTOLIC BLOOD PRESSURE: 78 MMHG | WEIGHT: 205.6 LBS | BODY MASS INDEX: 30.45 KG/M2 | SYSTOLIC BLOOD PRESSURE: 124 MMHG | HEART RATE: 64 BPM | RESPIRATION RATE: 16 BRPM | HEIGHT: 69 IN

## 2024-07-18 DIAGNOSIS — J01.00 ACUTE NON-RECURRENT MAXILLARY SINUSITIS: Primary | ICD-10-CM

## 2024-07-18 PROCEDURE — 3074F SYST BP LT 130 MM HG: CPT | Performed by: NURSE PRACTITIONER

## 2024-07-18 PROCEDURE — 3078F DIAST BP <80 MM HG: CPT | Performed by: NURSE PRACTITIONER

## 2024-07-18 PROCEDURE — 1123F ACP DISCUSS/DSCN MKR DOCD: CPT | Performed by: NURSE PRACTITIONER

## 2024-07-18 PROCEDURE — 99213 OFFICE O/P EST LOW 20 MIN: CPT | Performed by: NURSE PRACTITIONER

## 2024-07-18 RX ORDER — AMOXICILLIN 875 MG/1
875 TABLET, COATED ORAL 2 TIMES DAILY
Qty: 20 TABLET | Refills: 0 | Status: SHIPPED | OUTPATIENT
Start: 2024-07-18 | End: 2024-07-28

## 2024-07-18 NOTE — PROGRESS NOTES
febuxostat (ULORIC) 40 MG TABS tablet, Take 1 tablet by mouth daily, Disp: 30 tablet, Rfl: 5    Allergies:     Allergies   Allergen Reactions    Gadolinium Angioedema       Social History:     Social History     Tobacco Use    Smoking status: Never    Smokeless tobacco: Never   Vaping Use    Vaping Use: Never used   Substance Use Topics    Alcohol use: Yes     Comment: rare    Drug use: No       Physical Exam:     Vitals:    07/18/24 1225   BP: 124/78   Site: Right Upper Arm   Position: Sitting   Pulse: 64   Resp: 16   Temp: 97.5 °F (36.4 °C)   TempSrc: Temporal   SpO2: 99%   Weight: 93.3 kg (205 lb 9.6 oz)   Height: 1.753 m (5' 9\")       Physical Exam (PE)    Physical Exam  Constitutional:       Appearance: Normal appearance.   HENT:      Head: Normocephalic.      Right Ear: Tympanic membrane, ear canal and external ear normal.      Left Ear: Tympanic membrane, ear canal and external ear normal.      Nose: Congestion and rhinorrhea present.      Right Sinus: Maxillary sinus tenderness present.      Mouth/Throat:      Mouth: Mucous membranes are moist.      Pharynx: Oropharynx is clear. No oropharyngeal exudate or posterior oropharyngeal erythema.   Eyes:      Pupils: Pupils are equal, round, and reactive to light.   Cardiovascular:      Rate and Rhythm: Normal rate and regular rhythm.      Pulses: Normal pulses.      Heart sounds: Normal heart sounds.   Pulmonary:      Effort: Pulmonary effort is normal.      Breath sounds: Normal breath sounds. No wheezing, rhonchi or rales.   Abdominal:      General: Bowel sounds are normal.      Palpations: Abdomen is soft.   Musculoskeletal:         General: Normal range of motion.      Cervical back: Normal range of motion and neck supple.   Lymphadenopathy:      Cervical: No cervical adenopathy.   Skin:     General: Skin is warm and dry.      Capillary Refill: Capillary refill takes less than 2 seconds.   Neurological:      General: No focal deficit present.      Mental

## 2024-07-19 ENCOUNTER — TELEPHONE (OUTPATIENT)
Dept: PRIMARY CARE CLINIC | Age: 69
End: 2024-07-19

## 2024-07-19 NOTE — TELEPHONE ENCOUNTER
Would not give him a steroid shot as it is not typically recommended for bacterial infections.    Best regards,  Mark Gonzalez

## 2024-07-19 NOTE — TELEPHONE ENCOUNTER
PT was at walk in care yesterday with symptoms of Headache, stuffy nose, drainage, right ear pain.  They gave him antibiotics but PT wanted to see if Dr could give him a steroid shot.

## 2024-07-19 NOTE — TELEPHONE ENCOUNTER
Patient states that he wanted an appointment to come in today. Per Dr. Gonzalez, advised to  amoxicillin and start medication. If not better by Tuesday call us to schedule a same day

## 2024-08-20 ENCOUNTER — OFFICE VISIT (OUTPATIENT)
Dept: FAMILY MEDICINE CLINIC | Age: 69
End: 2024-08-20
Payer: MEDICARE

## 2024-08-20 VITALS
HEIGHT: 69 IN | BODY MASS INDEX: 30.75 KG/M2 | DIASTOLIC BLOOD PRESSURE: 72 MMHG | HEART RATE: 54 BPM | SYSTOLIC BLOOD PRESSURE: 122 MMHG | WEIGHT: 207.6 LBS | TEMPERATURE: 97.4 F | OXYGEN SATURATION: 97 %

## 2024-08-20 DIAGNOSIS — R10.30 LOWER ABDOMINAL PAIN: Primary | ICD-10-CM

## 2024-08-20 PROCEDURE — 3078F DIAST BP <80 MM HG: CPT | Performed by: PHYSICIAN ASSISTANT

## 2024-08-20 PROCEDURE — 99215 OFFICE O/P EST HI 40 MIN: CPT | Performed by: PHYSICIAN ASSISTANT

## 2024-08-20 PROCEDURE — 1123F ACP DISCUSS/DSCN MKR DOCD: CPT | Performed by: PHYSICIAN ASSISTANT

## 2024-08-20 PROCEDURE — 3074F SYST BP LT 130 MM HG: CPT | Performed by: PHYSICIAN ASSISTANT

## 2024-08-20 NOTE — PROGRESS NOTES
24  Akshat Steward : 1955 Sex: male  Age 68 y.o.      Subjective:  Chief Complaint   Patient presents with    Abdominal Pain     Started 2 days ago         HPI:   HPI  Akshat Steward , 68 y.o. male presents to [facility] care for evaluation of lower abdominal pain.  The patient has had this lower abdominal pain ongoing for last couple of days.  The patient has had some recurrent bouts of lower abdominal discomfort, pain.  The patient has been worked up the patient states that he is trying to establish with a new PCP and actually went there just prior to arrival and they did blood work and a urine.  He does not know the results at this time but he came here because he went to get an x-ray.  The patient states that he consider going to the emergency department but because of the weight and the time he did not want to stay at the ER.        ROS:   Unless otherwise stated in this report the patient's positive and negative responses for review of systems for constitutional, eyes, ENT, cardiovascular, respiratory, gastrointestinal, neurological, , musculoskeletal, and integument systems and related systems to the presenting problem are either stated in the history of present illness or were not pertinent or were negative for the symptoms and/or complaints related to the presenting medical problem.  Positives and pertinent negatives as per HPI.  All others reviewed and are negative.      PMH:     Past Medical History:   Diagnosis Date    Anxiety     GERD (gastroesophageal reflux disease)     Gout     Hyperlipidemia     Irritable bowel syndrome        Past Surgical History:   Procedure Laterality Date    SHOULDER SURGERY Right     shoulder replacment    TONSILLECTOMY         Family History   Problem Relation Age of Onset    Heart Disease Mother     Dementia Mother     Heart Disease Father         heart valve replaced    Kidney Disease Father     No Known Problems Sister     No Known Problems Brother

## 2024-09-20 ENCOUNTER — OFFICE VISIT (OUTPATIENT)
Dept: FAMILY MEDICINE CLINIC | Age: 69
End: 2024-09-20

## 2024-09-20 VITALS
OXYGEN SATURATION: 99 % | BODY MASS INDEX: 30.42 KG/M2 | SYSTOLIC BLOOD PRESSURE: 146 MMHG | HEART RATE: 87 BPM | DIASTOLIC BLOOD PRESSURE: 92 MMHG | WEIGHT: 206 LBS | TEMPERATURE: 97.9 F

## 2024-09-20 DIAGNOSIS — J02.9 ACUTE PHARYNGITIS, UNSPECIFIED ETIOLOGY: Primary | ICD-10-CM

## 2024-09-20 RX ORDER — METHYLPREDNISOLONE ACETATE 40 MG/ML
40 INJECTION, SUSPENSION INTRA-ARTICULAR; INTRALESIONAL; INTRAMUSCULAR; SOFT TISSUE ONCE
Status: COMPLETED | OUTPATIENT
Start: 2024-09-20 | End: 2024-09-20

## 2024-09-20 RX ADMIN — METHYLPREDNISOLONE ACETATE 40 MG: 40 INJECTION, SUSPENSION INTRA-ARTICULAR; INTRALESIONAL; INTRAMUSCULAR; SOFT TISSUE at 15:30

## 2024-09-25 ENCOUNTER — OFFICE VISIT (OUTPATIENT)
Dept: GASTROENTEROLOGY | Age: 69
End: 2024-09-25
Payer: MEDICARE

## 2024-09-25 VITALS
TEMPERATURE: 97.5 F | RESPIRATION RATE: 18 BRPM | HEIGHT: 69 IN | WEIGHT: 205 LBS | DIASTOLIC BLOOD PRESSURE: 100 MMHG | HEART RATE: 56 BPM | OXYGEN SATURATION: 99 % | SYSTOLIC BLOOD PRESSURE: 180 MMHG | BODY MASS INDEX: 30.36 KG/M2

## 2024-09-25 DIAGNOSIS — R10.30 LOWER ABDOMINAL PAIN: ICD-10-CM

## 2024-09-25 DIAGNOSIS — K21.9 GASTROESOPHAGEAL REFLUX DISEASE, UNSPECIFIED WHETHER ESOPHAGITIS PRESENT: ICD-10-CM

## 2024-09-25 DIAGNOSIS — K58.8 OTHER IRRITABLE BOWEL SYNDROME: Primary | ICD-10-CM

## 2024-09-25 DIAGNOSIS — R10.13 DYSPEPSIA: ICD-10-CM

## 2024-09-25 PROCEDURE — 3077F SYST BP >= 140 MM HG: CPT | Performed by: STUDENT IN AN ORGANIZED HEALTH CARE EDUCATION/TRAINING PROGRAM

## 2024-09-25 PROCEDURE — 99213 OFFICE O/P EST LOW 20 MIN: CPT | Performed by: STUDENT IN AN ORGANIZED HEALTH CARE EDUCATION/TRAINING PROGRAM

## 2024-09-25 PROCEDURE — 3080F DIAST BP >= 90 MM HG: CPT | Performed by: STUDENT IN AN ORGANIZED HEALTH CARE EDUCATION/TRAINING PROGRAM

## 2024-09-25 PROCEDURE — 1123F ACP DISCUSS/DSCN MKR DOCD: CPT | Performed by: STUDENT IN AN ORGANIZED HEALTH CARE EDUCATION/TRAINING PROGRAM

## 2024-09-25 NOTE — PROGRESS NOTES
Gastroenterology, Hepatology, &  Advanced Endoscopy    Progress note      HPI:   Akshat Steward is a 68 y.o. male w/ PMH of  has a past medical history of Anxiety, GERD (gastroesophageal reflux disease), Gout, Hyperlipidemia, and Irritable bowel syndrome. who presents to the office for follow-up of IBS/GERD    Patient mentions he is still having lower abdominal pain but it has slightly improved. Patient was last seen in office in May, was recommended to have FODMAP diet.  Also recommended to have PPI daily and Carafate and Bentyl as needed.  He was prescribed MiraLAX for constipation.  Patient mentions medications prescribed during last visit did not help and it was making him constipated and was causing dry mouth, so he stopped taking the medications.  He is still having burping  and acid reflux.  Patient is trying to watch diet, has decreased alcohol intake to occasional glass of wine and has cut down sweets.  Patient started taking probiotics but stopped after 3 weeks as the cramping continued.  Patient denies nausea vomiting.  He moves bowel every other day, no constipation now. He occasionally uses colace which usually helps with bowel movement in 2-3 days.  Patient does not want endoscopy.     Last colonoscopy was in 3/2023 which was negative for any acute inflammatory/ulcerative process, showed diverticulosis.  Upper GI SBFT on 3/2024 showed mild GERD.         ALT   Date Value Ref Range Status   05/28/2024 9 0 - 40 U/L Final   08/02/2023 13 0 - 40 U/L Final   02/13/2023 13 0 - 40 U/L Final     AST   Date Value Ref Range Status   05/28/2024 14 0 - 39 U/L Final   08/02/2023 19 0 - 39 U/L Final     Comment:     SPECIMEN SLIGHTLY HEMOLYZED, RESULTS MAY BE ADVERSELY AFFECTED.   02/13/2023 13 0 - 39 U/L Final     Alkaline Phosphatase   Date Value Ref Range Status   05/28/2024 93 40 - 129 U/L Final   08/02/2023 95 40 - 129 U/L Final   02/13/2023 86 40 - 129 U/L Final     Total Bilirubin   Date Value Ref Range

## 2024-09-26 ENCOUNTER — TELEPHONE (OUTPATIENT)
Age: 69
End: 2024-09-26

## 2024-10-04 RX ORDER — CIPROFLOXACIN 250 MG/1
250 TABLET, FILM COATED ORAL 2 TIMES DAILY
Qty: 20 TABLET | Refills: 0 | Status: SHIPPED | OUTPATIENT
Start: 2024-10-04 | End: 2024-10-14

## 2024-10-04 RX ORDER — METRONIDAZOLE 250 MG/1
250 TABLET ORAL 3 TIMES DAILY
Qty: 30 TABLET | Refills: 0 | Status: SHIPPED | OUTPATIENT
Start: 2024-10-04 | End: 2024-10-14

## 2024-11-05 ENCOUNTER — OFFICE VISIT (OUTPATIENT)
Dept: FAMILY MEDICINE CLINIC | Age: 69
End: 2024-11-05

## 2024-11-05 VITALS
DIASTOLIC BLOOD PRESSURE: 86 MMHG | OXYGEN SATURATION: 98 % | TEMPERATURE: 97.5 F | HEART RATE: 78 BPM | SYSTOLIC BLOOD PRESSURE: 132 MMHG | BODY MASS INDEX: 30.57 KG/M2 | WEIGHT: 207 LBS

## 2024-11-05 DIAGNOSIS — J01.90 ACUTE NON-RECURRENT SINUSITIS, UNSPECIFIED LOCATION: Primary | ICD-10-CM

## 2024-11-05 DIAGNOSIS — M54.50 LOW BACK PAIN, UNSPECIFIED BACK PAIN LATERALITY, UNSPECIFIED CHRONICITY, UNSPECIFIED WHETHER SCIATICA PRESENT: ICD-10-CM

## 2024-11-05 DIAGNOSIS — R51.9 SINUS HEADACHE: ICD-10-CM

## 2024-11-05 RX ORDER — METHYLPREDNISOLONE ACETATE 80 MG/ML
80 INJECTION, SUSPENSION INTRA-ARTICULAR; INTRALESIONAL; INTRAMUSCULAR; SOFT TISSUE ONCE
Status: COMPLETED | OUTPATIENT
Start: 2024-11-05 | End: 2024-11-05

## 2024-11-05 RX ORDER — AZITHROMYCIN 250 MG/1
TABLET, FILM COATED ORAL
Qty: 6 TABLET | Refills: 0 | Status: SHIPPED | OUTPATIENT
Start: 2024-11-05 | End: 2024-11-15

## 2024-11-05 RX ADMIN — METHYLPREDNISOLONE ACETATE 80 MG: 80 INJECTION, SUSPENSION INTRA-ARTICULAR; INTRALESIONAL; INTRAMUSCULAR; SOFT TISSUE at 15:43

## 2024-11-05 NOTE — PROGRESS NOTES
seen today for sinusitis and headache.    Diagnoses and all orders for this visit:    Acute non-recurrent sinusitis, unspecified location  -     methylPREDNISolone acetate (DEPO-MEDROL) injection 80 mg    Sinus headache    Low back pain, unspecified back pain laterality, unspecified chronicity, unspecified whether sciatica present  -     XR LUMBAR SPINE (2-3 VIEWS); Future  -     methylPREDNISolone acetate (DEPO-MEDROL) injection 80 mg    Other orders  -     azithromycin (ZITHROMAX) 250 MG tablet; 500mg on day 1 followed by 250mg on days 2 - 5        The patient is to call for any concerns or return if any of the signs or symptoms worsen. The patient is to follow-up with PCP in the next 2-3 days for repeat evaluation repeat assessment or go directly to the emergency department.     SIGNATURE: Shahriar Solis III, PA-C    This document may have been prepared at least partially through the use of voice recognition software. Although effort is taken to assure the accuracy ofthis document, it is possible that grammatical, syntax, or spelling errors may occur.

## 2024-12-19 ENCOUNTER — OFFICE VISIT (OUTPATIENT)
Dept: FAMILY MEDICINE CLINIC | Age: 69
End: 2024-12-19
Payer: MEDICARE

## 2024-12-19 VITALS
HEART RATE: 88 BPM | TEMPERATURE: 97.7 F | OXYGEN SATURATION: 98 % | WEIGHT: 212.8 LBS | DIASTOLIC BLOOD PRESSURE: 74 MMHG | BODY MASS INDEX: 31.43 KG/M2 | SYSTOLIC BLOOD PRESSURE: 122 MMHG

## 2024-12-19 DIAGNOSIS — R09.81 NASAL CONGESTION: ICD-10-CM

## 2024-12-19 DIAGNOSIS — R09.82 POSTNASAL DRIP: ICD-10-CM

## 2024-12-19 DIAGNOSIS — J01.90 ACUTE NON-RECURRENT SINUSITIS, UNSPECIFIED LOCATION: Primary | ICD-10-CM

## 2024-12-19 DIAGNOSIS — R51.9 SINUS HEADACHE: ICD-10-CM

## 2024-12-19 PROCEDURE — 3078F DIAST BP <80 MM HG: CPT | Performed by: PHYSICIAN ASSISTANT

## 2024-12-19 PROCEDURE — 1159F MED LIST DOCD IN RCRD: CPT | Performed by: PHYSICIAN ASSISTANT

## 2024-12-19 PROCEDURE — 3074F SYST BP LT 130 MM HG: CPT | Performed by: PHYSICIAN ASSISTANT

## 2024-12-19 PROCEDURE — 1123F ACP DISCUSS/DSCN MKR DOCD: CPT | Performed by: PHYSICIAN ASSISTANT

## 2024-12-19 PROCEDURE — 1160F RVW MEDS BY RX/DR IN RCRD: CPT | Performed by: PHYSICIAN ASSISTANT

## 2024-12-19 PROCEDURE — 99213 OFFICE O/P EST LOW 20 MIN: CPT | Performed by: PHYSICIAN ASSISTANT

## 2024-12-19 RX ORDER — AZITHROMYCIN 250 MG/1
TABLET, FILM COATED ORAL
Qty: 6 TABLET | Refills: 0 | Status: SHIPPED | OUTPATIENT
Start: 2024-12-19 | End: 2024-12-29

## 2024-12-19 NOTE — PROGRESS NOTES
24  Akshat Steward : 1955 Sex: male  Age 69 y.o.      Subjective:  Chief Complaint   Patient presents with    Drainage     Started 2-3 days ago    Headache         HPI:     History of Present Illness  The patient is a 69-year-old male who presents for evaluation of a headache, scratchy throat, and cough.    He reports the onset of a headache on Monday night, accompanied by a scratchy throat and subsequent development of a cough. He has been self-medicating with clindamycin, of which he had 2 doses remaining, and took one this morning. He also reports using fluticasone for nasal symptoms. He expresses a desire to avoid further steroid use unless absolutely necessary.    He underwent an epidural steroid injection yesterday for his back pain, which he believes may have exacerbated his symptoms. He experienced a mild headache this morning, which he attributes to the injection, but notes that it appears to be resolving. He was informed that the full effects of the injection would be evident within a week.    MEDICATIONS  Current: Azithromycin, 1 dose this morning        ROS:   Unless otherwise stated in this report the patient's positive and negative responses for review of systems for constitutional, eyes, ENT, cardiovascular, respiratory, gastrointestinal, neurological, , musculoskeletal, and integument systems and related systems to the presenting problem are either stated in the history of present illness or were not pertinent or were negative for the symptoms and/or complaints related to the presenting medical problem.  Positives and pertinent negatives as per HPI.  All others reviewed and are negative.      PMH:     Past Medical History:   Diagnosis Date    Anxiety     GERD (gastroesophageal reflux disease)     Gout     Hyperlipidemia     Irritable bowel syndrome        Past Surgical History:   Procedure Laterality Date    SHOULDER SURGERY Right     shoulder replacment    TONSILLECTOMY

## 2025-01-04 ENCOUNTER — HOSPITAL ENCOUNTER (EMERGENCY)
Age: 70
Discharge: HOME | End: 2025-01-04
Attending: EMERGENCY MEDICINE
Payer: MEDICARE

## 2025-01-04 ENCOUNTER — APPOINTMENT (OUTPATIENT)
Dept: CT IMAGING | Age: 70
End: 2025-01-04
Payer: MEDICARE

## 2025-01-04 VITALS
RESPIRATION RATE: 17 BRPM | OXYGEN SATURATION: 97 % | HEIGHT: 69 IN | BODY MASS INDEX: 30.36 KG/M2 | HEART RATE: 63 BPM | SYSTOLIC BLOOD PRESSURE: 164 MMHG | DIASTOLIC BLOOD PRESSURE: 103 MMHG | TEMPERATURE: 98.5 F | WEIGHT: 205 LBS

## 2025-01-04 DIAGNOSIS — M54.41 ACUTE RIGHT-SIDED LOW BACK PAIN WITH RIGHT-SIDED SCIATICA: Primary | ICD-10-CM

## 2025-01-04 DIAGNOSIS — K57.90 DIVERTICULOSIS: ICD-10-CM

## 2025-01-04 DIAGNOSIS — R19.8: ICD-10-CM

## 2025-01-04 LAB
ALBUMIN SERPL-MCNC: 4.4 G/DL (ref 3.5–5.2)
ALP SERPL-CCNC: 114 U/L (ref 40–129)
ALT SERPL-CCNC: 13 U/L (ref 0–40)
ANION GAP SERPL CALCULATED.3IONS-SCNC: 8 MMOL/L (ref 7–16)
AST SERPL-CCNC: 16 U/L (ref 0–39)
BASOPHILS # BLD: 0.05 K/UL (ref 0–0.2)
BASOPHILS NFR BLD: 1 % (ref 0–2)
BILIRUB SERPL-MCNC: 0.4 MG/DL (ref 0–1.2)
BILIRUB UR QL STRIP: NEGATIVE
BUN SERPL-MCNC: 17 MG/DL (ref 6–23)
CALCIUM SERPL-MCNC: 9.4 MG/DL (ref 8.6–10.2)
CHLORIDE SERPL-SCNC: 102 MMOL/L (ref 98–107)
CLARITY UR: CLEAR
CO2 SERPL-SCNC: 26 MMOL/L (ref 22–29)
COLOR UR: YELLOW
CREAT SERPL-MCNC: 1 MG/DL (ref 0.7–1.2)
EOSINOPHIL # BLD: 0.05 K/UL (ref 0.05–0.5)
EOSINOPHILS RELATIVE PERCENT: 1 % (ref 0–6)
ERYTHROCYTE [DISTWIDTH] IN BLOOD BY AUTOMATED COUNT: 12.5 % (ref 11.5–15)
GFR, ESTIMATED: 78 ML/MIN/1.73M2
GLUCOSE SERPL-MCNC: 86 MG/DL (ref 74–99)
GLUCOSE UR STRIP-MCNC: NEGATIVE MG/DL
HCT VFR BLD AUTO: 48.5 % (ref 37–54)
HGB BLD-MCNC: 16.7 G/DL (ref 12.5–16.5)
HGB UR QL STRIP.AUTO: ABNORMAL
IMM GRANULOCYTES # BLD AUTO: 0.04 K/UL (ref 0–0.58)
IMM GRANULOCYTES NFR BLD: 0 % (ref 0–5)
KETONES UR STRIP-MCNC: NEGATIVE MG/DL
LEUKOCYTE ESTERASE UR QL STRIP: NEGATIVE
LYMPHOCYTES NFR BLD: 1.54 K/UL (ref 1.5–4)
LYMPHOCYTES RELATIVE PERCENT: 16 % (ref 20–42)
MCH RBC QN AUTO: 30.8 PG (ref 26–35)
MCHC RBC AUTO-ENTMCNC: 34.4 G/DL (ref 32–34.5)
MCV RBC AUTO: 89.5 FL (ref 80–99.9)
MONOCYTES NFR BLD: 0.8 K/UL (ref 0.1–0.95)
MONOCYTES NFR BLD: 8 % (ref 2–12)
MUCOUS THREADS URNS QL MICRO: PRESENT
NEUTROPHILS NFR BLD: 75 % (ref 43–80)
NEUTS SEG NFR BLD: 7.39 K/UL (ref 1.8–7.3)
NITRITE UR QL STRIP: NEGATIVE
PH UR STRIP: 6 [PH] (ref 5–9)
PLATELET # BLD AUTO: 285 K/UL (ref 130–450)
PMV BLD AUTO: 9.9 FL (ref 7–12)
POTASSIUM SERPL-SCNC: 4 MMOL/L (ref 3.5–5)
PROT SERPL-MCNC: 7.8 G/DL (ref 6.4–8.3)
PROT UR STRIP-MCNC: ABNORMAL MG/DL
RBC # BLD AUTO: 5.42 M/UL (ref 3.8–5.8)
RBC #/AREA URNS HPF: ABNORMAL /HPF
SODIUM SERPL-SCNC: 136 MMOL/L (ref 132–146)
SP GR UR STRIP: 1.02 (ref 1–1.03)
UROBILINOGEN UR STRIP-ACNC: 0.2 EU/DL (ref 0–1)
WBC #/AREA URNS HPF: ABNORMAL /HPF
WBC OTHER # BLD: 9.9 K/UL (ref 4.5–11.5)

## 2025-01-04 PROCEDURE — 74176 CT ABD & PELVIS W/O CONTRAST: CPT

## 2025-01-04 PROCEDURE — 85025 COMPLETE CBC W/AUTO DIFF WBC: CPT

## 2025-01-04 PROCEDURE — 80053 COMPREHEN METABOLIC PANEL: CPT

## 2025-01-04 PROCEDURE — 99284 EMERGENCY DEPT VISIT MOD MDM: CPT

## 2025-01-04 PROCEDURE — 81001 URINALYSIS AUTO W/SCOPE: CPT

## 2025-01-04 PROCEDURE — 6360000002 HC RX W HCPCS: Performed by: EMERGENCY MEDICINE

## 2025-01-04 PROCEDURE — 96374 THER/PROPH/DIAG INJ IV PUSH: CPT

## 2025-01-04 RX ORDER — METHOCARBAMOL 750 MG/1
750 TABLET, FILM COATED ORAL 3 TIMES DAILY PRN
Qty: 30 TABLET | Refills: 0 | Status: SHIPPED | OUTPATIENT
Start: 2025-01-04 | End: 2025-01-14

## 2025-01-04 RX ORDER — DICLOFENAC POTASSIUM 50 MG/1
50 TABLET, FILM COATED ORAL 3 TIMES DAILY PRN
Qty: 15 TABLET | Refills: 0 | Status: SHIPPED | OUTPATIENT
Start: 2025-01-04

## 2025-01-04 RX ORDER — KETOROLAC TROMETHAMINE 30 MG/ML
30 INJECTION, SOLUTION INTRAMUSCULAR; INTRAVENOUS ONCE
Status: COMPLETED | OUTPATIENT
Start: 2025-01-04 | End: 2025-01-04

## 2025-01-04 RX ORDER — LIDOCAINE 50 MG/G
1 PATCH TOPICAL EVERY 24 HOURS
Qty: 10 PATCH | Refills: 0 | Status: SHIPPED | OUTPATIENT
Start: 2025-01-04 | End: 2025-01-14

## 2025-01-04 RX ORDER — DEXAMETHASONE SODIUM PHOSPHATE 10 MG/ML
10 INJECTION INTRAMUSCULAR; INTRAVENOUS ONCE
Status: DISCONTINUED | OUTPATIENT
Start: 2025-01-04 | End: 2025-01-04 | Stop reason: HOSPADM

## 2025-01-04 RX ADMIN — KETOROLAC TROMETHAMINE 30 MG: 30 INJECTION, SOLUTION INTRAMUSCULAR at 16:04

## 2025-01-04 ASSESSMENT — PAIN DESCRIPTION - LOCATION
LOCATION: BACK
LOCATION: BACK

## 2025-01-04 ASSESSMENT — PAIN DESCRIPTION - PAIN TYPE: TYPE: ACUTE PAIN

## 2025-01-04 ASSESSMENT — PAIN SCALES - GENERAL
PAINLEVEL_OUTOF10: 10
PAINLEVEL_OUTOF10: 10

## 2025-01-04 ASSESSMENT — PAIN - FUNCTIONAL ASSESSMENT: PAIN_FUNCTIONAL_ASSESSMENT: ACTIVITIES ARE NOT PREVENTED

## 2025-01-04 ASSESSMENT — PAIN DESCRIPTION - FREQUENCY: FREQUENCY: CONTINUOUS

## 2025-01-04 ASSESSMENT — PAIN DESCRIPTION - DESCRIPTORS
DESCRIPTORS: ACHING
DESCRIPTORS: DISCOMFORT

## 2025-01-04 ASSESSMENT — PAIN DESCRIPTION - ORIENTATION
ORIENTATION: LOWER
ORIENTATION: LOWER

## 2025-01-04 NOTE — DISCHARGE INSTRUCTIONS
Call family doctor tomorrow and schedule a followup appointment to be seen in 2 days    Have your doctor obtain  finalized report of CT scan today    CT ABDOMEN PELVIS WO CONTRAST Additional Contrast? None   Final Result   1. No acute intra-abdominal or pelvic pathology.   2. Sigmoid diverticulosis.   3. Colonic fecal retention.   4. Stable 7 cm right pelvic cyst.

## 2025-01-04 NOTE — ED PROVIDER NOTES
[facility] EMERGENCY DEPARTMENT  EMERGENCY DEPARTMENT ENCOUNTER        Pt Name: Akshat Steward  MRN: [id number]  Birthdate 1955  Date of evaluation: 1/4/2025  Provider: Enmanuel Gates MD  PCP: Mark Gonzalez DO  Note Started: 3:39 PM EST 1/4/25    CHIEF COMPLAINT       Chief Complaint   Patient presents with    Back Pain     Ongoing for a month, worsened today, lower back pain radiates down right leg, had epidural 2 weeks ago       HISTORY OF PRESENT ILLNESS: 1 or more Elements   History From: Patient    Limitations to history : None    Akshat Steward is a 69 y.o. male  has a past medical history of Anxiety, GERD (gastroesophageal reflux disease), Gout, Hyperlipidemia, and Irritable bowel syndrome. who presents to the ED with the chief complaint of back pain.    Patient describes severe cramping right sided lower back pain radiating to right lower flank and abdomen and right hip.  States that he woke up with this pain.  Patient states that the pain was 8-9/10 this morning but is 4-5/10 at this time.  Ibuprofen and icing has not helped the pain.  He denies numbness, tingling, weakness in his extremities, loss of bladder or bowel control. Patient states that he has ongoing chronic back pain secondary to degenerative disc disease and arthritis.  However his pain today was worse than anything that he is ever experienced before.  He is following with pain management at [facility].  He recently had a lumbar epidural injection 2 weeks ago.  States that the epidural did not help him with the pain.  He has been discussing ablative therapy with his specialist.    Patient denies fever, chills, headache, shortness of breath, chest pain, abdominal pain, nausea, vomiting, diarrhea, lightheadedness, dysuria, hematuria, hematochezia, and melena.    Nursing Notes were all reviewed and agreed with or any disagreements were addressed in the HPI.        REVIEW OF EXTERNAL  unremarkable except for trace hemoglobin and protein on UA.  Patient underwent a CT abdomen pelvis without contrast which revealed no acute intra-abdominal or pelvic pathology.  No evidence of kidney stones.  Incidentally patient was found to have a stable 7 cm right pelvic cyst, sigmoid diverticulosis and fecal retention in the colon.  He received IV Toradol injection.  On repeat evaluation patient was found to be feeling much better with significant reduction in his pain.  He was discharged with Robaxin, diclofenac and lidocaine patch for outpatient management of pain until patient sees specialist.  He is advised to follow-up with his PCP.    CONSULTS:   None        FINAL IMPRESSION      1. Acute right-sided low back pain with right-sided sciatica    2. Pelvic cyst in male    3. Diverticulosis          DISPOSITION/PLAN     DISPOSITION Decision To Discharge 01/04/2025 05:06:52 PM    DISPOSITION  Disposition: Discharge to home  Patient condition is stable    1/4/25, 3:39 PM JESSICA.    Enmanuel Gates MD  Emergency Medicine    PATIENT REFERRED TO:  Harsh Kerr MD  [address]  [address]  [facility] [address]  [phone]    Schedule an appointment as soon as possible for a visit       Mark Gonzalez, DO  [address] [facility]  [phone]    Schedule an appointment as soon as possible for a visit in 3 days        DISCHARGE MEDICATIONS:  Discharge Medication List as of 1/4/2025  5:21 PM        START taking these medications    Details   methocarbamol (ROBAXIN-750) 750 MG tablet Take 1 tablet by mouth 3 times daily as needed (pain), Disp-30 tablet, R-0Normal      lidocaine (LIDODERM) 5 % Place 1 patch onto the skin every 24 hours for 10 days 12 hours on, 12 hours off., Disp-10 patch, R-0Normal      diclofenac (CATAFLAM) 50 MG tablet Take 1 tablet by mouth 3 times daily as needed for Pain, Disp-15 tablet, R-0Normal             DISCONTINUED MEDICATIONS:  Discharge Medication List as

## 2025-01-04 NOTE — DISCHARGE INSTR - COC
Continuity of Care Form    Patient Name: Akshat Steward   :  1955  MRN:  [id number]    Admit date:  2025  Discharge date:  ***    Code Status Order: No Order   Advance Directives:   Advance Care Flowsheet Documentation             Admitting Physician:  No admitting provider for patient encounter.  PCP: Mark Gonzalez DO    Discharging Nurse: ***  Discharging Hospital Unit/Room#: DISPO/D02  Discharging Unit Phone Number: ***    Emergency Contact:   Extended Emergency Contact Information  Primary Emergency Contact: Kaley Steward  Address: [address] [place] [address]  Home Phone: [phone]  Mobile Phone: [phone]  Relation: Spouse   needed? No    Past Surgical History:  Past Surgical History:   Procedure Laterality Date    SHOULDER SURGERY Right     shoulder replacment    TONSILLECTOMY         Immunization History:     There is no immunization history on file for this patient.    Active Problems:  Patient Active Problem List   Diagnosis Code    Lower abdominal pain R10.30    Acute gout of left foot M10.9    Primary hypertension I10    Anxiety F41.9    Noncompliance by refusing intervention or support Z91.199    Hypochondria F45.21    Generalized abdominal pain R10.84    Chronic gout without tophus M1A.9XX0    Chronic rhinitis J31.0    Dyspepsia R10.13    Other irritable bowel syndrome K58.8    Gastroesophageal reflux disease K21.9       Isolation/Infection:   Isolation            No Isolation          Patient Infection Status       None to display            Nurse Assessment:  Last Vital Signs: BP (!) 164/103   Pulse 63   Temp 98.5 °F (36.9 °C) (Oral)   Resp 17   Ht 1.753 m (5' 9\")   Wt 93 kg (205 lb)   SpO2 97%   BMI 30.27 kg/m²     Last documented pain score (0-10 scale): Pain Level: 10  Last Weight:   Wt Readings from Last 1 Encounters:   25 93 kg (205 lb)     Mental Status:  {IP PT MENTAL STATUS:}    IV Access:  {[facility] [place] [place] IV

## 2025-01-15 ENCOUNTER — OFFICE VISIT (OUTPATIENT)
Dept: GASTROENTEROLOGY | Age: 70
End: 2025-01-15
Payer: MEDICARE

## 2025-01-15 VITALS
HEIGHT: 69 IN | SYSTOLIC BLOOD PRESSURE: 128 MMHG | TEMPERATURE: 97 F | OXYGEN SATURATION: 98 % | HEART RATE: 63 BPM | DIASTOLIC BLOOD PRESSURE: 76 MMHG | WEIGHT: 209 LBS | BODY MASS INDEX: 30.96 KG/M2

## 2025-01-15 DIAGNOSIS — K58.8 OTHER IRRITABLE BOWEL SYNDROME: Primary | ICD-10-CM

## 2025-01-15 DIAGNOSIS — K21.9 GASTROESOPHAGEAL REFLUX DISEASE, UNSPECIFIED WHETHER ESOPHAGITIS PRESENT: ICD-10-CM

## 2025-01-15 PROCEDURE — 99213 OFFICE O/P EST LOW 20 MIN: CPT | Performed by: STUDENT IN AN ORGANIZED HEALTH CARE EDUCATION/TRAINING PROGRAM

## 2025-01-15 PROCEDURE — 1159F MED LIST DOCD IN RCRD: CPT | Performed by: STUDENT IN AN ORGANIZED HEALTH CARE EDUCATION/TRAINING PROGRAM

## 2025-01-15 PROCEDURE — 3074F SYST BP LT 130 MM HG: CPT | Performed by: STUDENT IN AN ORGANIZED HEALTH CARE EDUCATION/TRAINING PROGRAM

## 2025-01-15 PROCEDURE — 1123F ACP DISCUSS/DSCN MKR DOCD: CPT | Performed by: STUDENT IN AN ORGANIZED HEALTH CARE EDUCATION/TRAINING PROGRAM

## 2025-01-15 PROCEDURE — 3078F DIAST BP <80 MM HG: CPT | Performed by: STUDENT IN AN ORGANIZED HEALTH CARE EDUCATION/TRAINING PROGRAM

## 2025-01-15 NOTE — PATIENT INSTRUCTIONS
Step 1: Take 2-3 bottles of magnesium citrate for complete stool evacuation.    Step 2: Immediately after completing the stool evacuation, begin taking 2 tabs of Senna each night and then take Miralax each morning. Take additional doses of Miralax as needed to have a relieving bowel movement DAILY.

## 2025-01-15 NOTE — PROGRESS NOTES
Gastroenterology, Hepatology, &  Advanced Endoscopy    Progress Note        HPI:   Akshat Steward is a 69 y.o. male w/ PMH of  has a past medical history of Anxiety, GERD (gastroesophageal reflux disease), Gout, Hyperlipidemia, and Irritable bowel syndrome. who presents to the clinic for follow up.     He was last seen in clinic on 9/25/24 and discussed using Rifaximin for two weeks for treament of SIBO but insurance would not cover. We prescribed low dose Ciprofloxacin and Metronidazole to take instead but the patient reports that he did not take them as he felt that they would potentially worsen symptoms or lead to other symptoms. He also reports that he has stopped taking his PPI. He reports that Miralax and Senna do help to improve bowel habits. He reports that his stomach continues to growl and make noise but denies pain.       ALT   Date Value Ref Range Status   01/04/2025 13 0 - 40 U/L Final   05/28/2024 9 0 - 40 U/L Final   08/02/2023 13 0 - 40 U/L Final     AST   Date Value Ref Range Status   01/04/2025 16 0 - 39 U/L Final   05/28/2024 14 0 - 39 U/L Final   08/02/2023 19 0 - 39 U/L Final     Comment:     SPECIMEN SLIGHTLY HEMOLYZED, RESULTS MAY BE ADVERSELY AFFECTED.     Alkaline Phosphatase   Date Value Ref Range Status   01/04/2025 114 40 - 129 U/L Final   05/28/2024 93 40 - 129 U/L Final   08/02/2023 95 40 - 129 U/L Final     Total Bilirubin   Date Value Ref Range Status   01/04/2025 0.4 0.0 - 1.2 mg/dL Final   05/28/2024 0.4 0.0 - 1.2 mg/dL Final   08/02/2023 0.4 0.0 - 1.2 mg/dL Final      Lab Results   Component Value Date    WBC 9.9 01/04/2025    HGB 16.7 (H) 01/04/2025    HCT 48.5 01/04/2025     01/04/2025     01/04/2025    K 4.0 01/04/2025     01/04/2025    CREATININE 1.0 01/04/2025    BUN 17 01/04/2025    CO2 26 01/04/2025    GLUCOSE 86 01/04/2025    TSH 2.45 05/28/2024        Lipase   Date Value Ref Range Status   05/28/2024 44 13 - 60 U/L Final   02/13/2023 29 13 - 60 U/L

## 2025-03-10 ENCOUNTER — APPOINTMENT (OUTPATIENT)
Dept: GENERAL RADIOLOGY | Age: 70
End: 2025-03-10
Payer: MEDICARE

## 2025-03-10 ENCOUNTER — HOSPITAL ENCOUNTER (EMERGENCY)
Age: 70
Discharge: HOME | End: 2025-03-10
Payer: MEDICARE

## 2025-03-10 VITALS
DIASTOLIC BLOOD PRESSURE: 121 MMHG | RESPIRATION RATE: 18 BRPM | OXYGEN SATURATION: 98 % | HEART RATE: 79 BPM | SYSTOLIC BLOOD PRESSURE: 174 MMHG | TEMPERATURE: 97.7 F

## 2025-03-10 DIAGNOSIS — I10 HYPERTENSION, UNSPECIFIED TYPE: Primary | ICD-10-CM

## 2025-03-10 LAB
ALBUMIN SERPL-MCNC: 4.7 G/DL (ref 3.5–5.2)
ALP SERPL-CCNC: 114 U/L (ref 40–129)
ALT SERPL-CCNC: 14 U/L (ref 0–40)
ANION GAP SERPL CALCULATED.3IONS-SCNC: 16 MMOL/L (ref 7–16)
AST SERPL-CCNC: 20 U/L (ref 0–39)
BASOPHILS # BLD: 0.05 K/UL (ref 0–0.2)
BASOPHILS NFR BLD: 1 % (ref 0–2)
BILIRUB SERPL-MCNC: 0.5 MG/DL (ref 0–1.2)
BUN SERPL-MCNC: 15 MG/DL (ref 6–23)
CALCIUM SERPL-MCNC: 9.4 MG/DL (ref 8.6–10.2)
CHLORIDE SERPL-SCNC: 101 MMOL/L (ref 98–107)
CO2 SERPL-SCNC: 23 MMOL/L (ref 22–29)
CREAT SERPL-MCNC: 1.1 MG/DL (ref 0.7–1.2)
EOSINOPHIL # BLD: 0.03 K/UL (ref 0.05–0.5)
EOSINOPHILS RELATIVE PERCENT: 1 % (ref 0–6)
ERYTHROCYTE [DISTWIDTH] IN BLOOD BY AUTOMATED COUNT: 12 % (ref 11.5–15)
GFR, ESTIMATED: 77 ML/MIN/1.73M2
GLUCOSE SERPL-MCNC: 77 MG/DL (ref 74–99)
HCT VFR BLD AUTO: 48.9 % (ref 37–54)
HGB BLD-MCNC: 16.7 G/DL (ref 12.5–16.5)
IMM GRANULOCYTES # BLD AUTO: <0.03 K/UL (ref 0–0.58)
IMM GRANULOCYTES NFR BLD: 0 % (ref 0–5)
LYMPHOCYTES NFR BLD: 1.23 K/UL (ref 1.5–4)
LYMPHOCYTES RELATIVE PERCENT: 19 % (ref 20–42)
MCH RBC QN AUTO: 30.5 PG (ref 26–35)
MCHC RBC AUTO-ENTMCNC: 34.2 G/DL (ref 32–34.5)
MCV RBC AUTO: 89.4 FL (ref 80–99.9)
MONOCYTES NFR BLD: 0.49 K/UL (ref 0.1–0.95)
MONOCYTES NFR BLD: 8 % (ref 2–12)
NEUTROPHILS NFR BLD: 72 % (ref 43–80)
NEUTS SEG NFR BLD: 4.7 K/UL (ref 1.8–7.3)
PLATELET # BLD AUTO: 280 K/UL (ref 130–450)
PMV BLD AUTO: 10.7 FL (ref 7–12)
POTASSIUM SERPL-SCNC: 4 MMOL/L (ref 3.5–5)
PROT SERPL-MCNC: 7.6 G/DL (ref 6.4–8.3)
RBC # BLD AUTO: 5.47 M/UL (ref 3.8–5.8)
SODIUM SERPL-SCNC: 140 MMOL/L (ref 132–146)
TROPONIN I SERPL HS-MCNC: 7 NG/L (ref 0–11)
WBC OTHER # BLD: 6.5 K/UL (ref 4.5–11.5)

## 2025-03-10 PROCEDURE — 71046 X-RAY EXAM CHEST 2 VIEWS: CPT

## 2025-03-10 PROCEDURE — 85025 COMPLETE CBC W/AUTO DIFF WBC: CPT

## 2025-03-10 PROCEDURE — 93005 ELECTROCARDIOGRAM TRACING: CPT | Performed by: PHYSICIAN ASSISTANT

## 2025-03-10 PROCEDURE — 84484 ASSAY OF TROPONIN QUANT: CPT

## 2025-03-10 PROCEDURE — 80053 COMPREHEN METABOLIC PANEL: CPT

## 2025-03-10 PROCEDURE — 99285 EMERGENCY DEPT VISIT HI MDM: CPT

## 2025-03-10 RX ORDER — AMLODIPINE BESYLATE 5 MG/1
5 TABLET ORAL ONCE
Status: DISCONTINUED | OUTPATIENT
Start: 2025-03-10 | End: 2025-03-10 | Stop reason: HOSPADM

## 2025-03-10 RX ORDER — LISINOPRIL 10 MG/1
10 TABLET ORAL DAILY
COMMUNITY
End: 2025-03-12

## 2025-03-10 NOTE — ED NOTES
Patient continuously yelling at RN's, refusesd amlodipine. States if I dont take IV out now he is ripping it out

## 2025-03-10 NOTE — ED PROVIDER NOTES
Independent MAURICIO Visit.        HPI:  3/10/25, Time: 12:22 PM EDT         Akshat Steward is a 69 y.o. male presenting to the ED for hypertension, beginning unknown amount of time ago.  The complaint has been persistent, moderate in severity, and worsened by nothing.  History is provided by the patient the emergency department today.  States that he was going to get an injection done with orthopedics and they found that his blood pressure was elevated.  States that they would not do the injection and needed to follow-up with his PCP for hypertension management.  Patient does not have a history of hypertension.  He is not on any medication.  Currently denies any chest pain or shortness of breath, headache, dizziness, lightheadedness, nausea, vomiting or vision changes.  Afebrile without recent travel or sick contacts.  Patient denies all other symptoms at this time.    Review of Systems:   A complete review of systems was performed and pertinent positives and negatives are stated within HPI, all other systems reviewed and are negative.          --------------------------------------------- PAST HISTORY ---------------------------------------------  Past Medical History:  has a past medical history of Anxiety, GERD (gastroesophageal reflux disease), Gout, Hyperlipidemia, and Irritable bowel syndrome.    Past Surgical History:  has a past surgical history that includes Tonsillectomy and shoulder surgery (Right).    Social History:  reports that he has never smoked. He has never used smokeless tobacco. He reports current alcohol use. He reports that he does not use drugs.    Family History: family history includes Bone Cancer in his maternal grandfather; Dementia in his mother; Heart Disease in his father and mother; Kidney Disease in his father; No Known Problems in his brother and sister.     The patient’s home medications have been reviewed.    Allergies: Gadolinium    --------------------------------------------------

## 2025-03-11 ENCOUNTER — CARE COORDINATION (OUTPATIENT)
Dept: CARE COORDINATION | Age: 70
End: 2025-03-11

## 2025-03-11 LAB
EKG ATRIAL RATE: 74 BPM
EKG P AXIS: 38 DEGREES
EKG P-R INTERVAL: 158 MS
EKG Q-T INTERVAL: 390 MS
EKG QRS DURATION: 72 MS
EKG QTC CALCULATION (BAZETT): 432 MS
EKG R AXIS: -7 DEGREES
EKG T AXIS: 17 DEGREES
EKG VENTRICULAR RATE: 74 BPM

## 2025-03-11 PROCEDURE — 93010 ELECTROCARDIOGRAM REPORT: CPT | Performed by: INTERNAL MEDICINE

## 2025-03-11 NOTE — CARE COORDINATION
ACM contacted Akshat for care coordination enrollment. ACM educated patient on care coordination and patient agreed. ACM reviewed medications and will continue with enrollment questions 3/12/25 as requested by patient as he is on his way to work.

## 2025-03-12 ENCOUNTER — OFFICE VISIT (OUTPATIENT)
Dept: FAMILY MEDICINE CLINIC | Age: 70
End: 2025-03-12
Payer: MEDICARE

## 2025-03-12 VITALS
RESPIRATION RATE: 18 BRPM | DIASTOLIC BLOOD PRESSURE: 88 MMHG | WEIGHT: 207 LBS | SYSTOLIC BLOOD PRESSURE: 136 MMHG | HEIGHT: 69 IN | TEMPERATURE: 98.4 F | BODY MASS INDEX: 30.66 KG/M2 | OXYGEN SATURATION: 99 % | HEART RATE: 65 BPM

## 2025-03-12 DIAGNOSIS — J01.90 ACUTE NON-RECURRENT SINUSITIS, UNSPECIFIED LOCATION: ICD-10-CM

## 2025-03-12 DIAGNOSIS — M54.50 CHRONIC BILATERAL LOW BACK PAIN WITHOUT SCIATICA: ICD-10-CM

## 2025-03-12 DIAGNOSIS — R09.82 POSTNASAL DRIP: ICD-10-CM

## 2025-03-12 DIAGNOSIS — R07.0 PAIN IN THROAT: Primary | ICD-10-CM

## 2025-03-12 DIAGNOSIS — I10 PRIMARY HYPERTENSION: ICD-10-CM

## 2025-03-12 DIAGNOSIS — G89.29 CHRONIC BILATERAL LOW BACK PAIN WITHOUT SCIATICA: ICD-10-CM

## 2025-03-12 LAB — S PYO AG THROAT QL: NORMAL

## 2025-03-12 PROCEDURE — 1123F ACP DISCUSS/DSCN MKR DOCD: CPT | Performed by: PHYSICIAN ASSISTANT

## 2025-03-12 PROCEDURE — 3075F SYST BP GE 130 - 139MM HG: CPT | Performed by: PHYSICIAN ASSISTANT

## 2025-03-12 PROCEDURE — 1159F MED LIST DOCD IN RCRD: CPT | Performed by: PHYSICIAN ASSISTANT

## 2025-03-12 PROCEDURE — 87880 STREP A ASSAY W/OPTIC: CPT | Performed by: PHYSICIAN ASSISTANT

## 2025-03-12 PROCEDURE — 1160F RVW MEDS BY RX/DR IN RCRD: CPT | Performed by: PHYSICIAN ASSISTANT

## 2025-03-12 PROCEDURE — 3079F DIAST BP 80-89 MM HG: CPT | Performed by: PHYSICIAN ASSISTANT

## 2025-03-12 PROCEDURE — 99214 OFFICE O/P EST MOD 30 MIN: CPT | Performed by: PHYSICIAN ASSISTANT

## 2025-03-12 RX ORDER — AMLODIPINE BESYLATE 5 MG/1
TABLET ORAL
COMMUNITY
Start: 2025-03-11

## 2025-03-12 RX ORDER — AZITHROMYCIN 250 MG/1
TABLET, FILM COATED ORAL
Qty: 6 TABLET | Refills: 0 | Status: SHIPPED | OUTPATIENT
Start: 2025-03-12 | End: 2025-03-22

## 2025-03-12 NOTE — PROGRESS NOTES
3/12/25  Akshat Steward : 1955 Sex: male  Age 69 y.o.      Subjective:  Chief Complaint   Patient presents with    Hypertension     ER Monday morning.  Said everything was normal.  Just took Norvasc today from new RX.  Thinks he got something in the ER.      Sinus Problem    Pharyngitis         HPI:     History of Present Illness  The patient is a 69-year-old male who presents for evaluation of hypertension, sinus drainage, and back pain.    He has been experiencing hypertension, which led to the postponement of a scheduled nerve block procedure last Thursday. His blood pressure was elevated during the pre-procedural check, prompting a referral to his primary care physician, Dr. Lozada. Despite discontinuing the medication on , his blood pressure remained high, necessitating an emergency room visit on Monday morning. In the ER, he underwent an EKG, two blood tests, and a chest x-ray, all of which yielded normal results. He was advised to consult with his primary care physician the following day for potential medication adjustments. Consequently, he was prescribed Norvasc, which he initiated today at 11:00 AM. Prior to taking the medication, his home blood pressure reading was 158/110. Dr. Lozada initiated lisinopril therapy on . However, by Saturday night, he developed severe headache, neck pain, facial flushing, and blurred vision.    He has been dealing with a back problem for 2 years now. He was supposed to get a nerve block last Thursday, but his blood pressure was so high that they sent him home and would not do the procedure. They told him to go to his family doctor and get this straight. He went to Dr. Lozada on Thursday afternoon, who put him on lisinopril. He took it on Thursday, Friday, and Saturday. On Saturday night, he had a terrible headache, neck ache, his face was flushed, and he had blurred vision. He called and was told to get off of it immediately. On

## 2025-03-17 ENCOUNTER — CARE COORDINATION (OUTPATIENT)
Dept: CARE COORDINATION | Age: 70
End: 2025-03-17

## 2025-03-17 SDOH — HEALTH STABILITY: PHYSICAL HEALTH: ON AVERAGE, HOW MANY MINUTES DO YOU ENGAGE IN EXERCISE AT THIS LEVEL?: 0 MIN

## 2025-03-17 SDOH — SOCIAL STABILITY: SOCIAL NETWORK: IN A TYPICAL WEEK, HOW MANY TIMES DO YOU TALK ON THE PHONE WITH FAMILY, FRIENDS, OR NEIGHBORS?: ONCE A WEEK

## 2025-03-17 SDOH — SOCIAL STABILITY: SOCIAL INSECURITY: WITHIN THE LAST YEAR, HAVE YOU BEEN AFRAID OF YOUR PARTNER OR EX-PARTNER?: NO

## 2025-03-17 SDOH — ECONOMIC STABILITY: FOOD INSECURITY: WITHIN THE PAST 12 MONTHS, THE FOOD YOU BOUGHT JUST DIDN'T LAST AND YOU DIDN'T HAVE MONEY TO GET MORE.: NEVER TRUE

## 2025-03-17 SDOH — SOCIAL STABILITY: SOCIAL NETWORK: HOW OFTEN DO YOU ATTEND CHURCH OR RELIGIOUS SERVICES?: MORE THAN 4 TIMES PER YEAR

## 2025-03-17 SDOH — HEALTH STABILITY: MENTAL HEALTH: HOW OFTEN DO YOU HAVE A DRINK CONTAINING ALCOHOL?: NEVER

## 2025-03-17 SDOH — SOCIAL STABILITY: SOCIAL NETWORK
DO YOU BELONG TO ANY CLUBS OR ORGANIZATIONS SUCH AS CHURCH GROUPS, UNIONS, FRATERNAL OR ATHLETIC GROUPS, OR SCHOOL GROUPS?: NO

## 2025-03-17 SDOH — SOCIAL STABILITY: SOCIAL NETWORK: HOW OFTEN DO YOU ATTEND MEETINGS OF THE CLUBS OR ORGANIZATIONS YOU BELONG TO?: NEVER

## 2025-03-17 SDOH — HEALTH STABILITY: PHYSICAL HEALTH: ON AVERAGE, HOW MANY DAYS PER WEEK DO YOU ENGAGE IN MODERATE TO STRENUOUS EXERCISE (LIKE A BRISK WALK)?: 0 DAYS

## 2025-03-17 SDOH — SOCIAL STABILITY: SOCIAL INSECURITY
WITHIN THE LAST YEAR, HAVE YOU BEEN KICKED, HIT, SLAPPED, OR OTHERWISE PHYSICALLY HURT BY YOUR PARTNER OR EX-PARTNER?: NO

## 2025-03-17 SDOH — HEALTH STABILITY: MENTAL HEALTH
DO YOU FEEL STRESS - TENSE, RESTLESS, NERVOUS, OR ANXIOUS, OR UNABLE TO SLEEP AT NIGHT BECAUSE YOUR MIND IS TROUBLED ALL THE TIME - THESE DAYS?: NOT AT ALL

## 2025-03-17 SDOH — SOCIAL STABILITY: SOCIAL INSECURITY
WITHIN THE LAST YEAR, HAVE YOU BEEN RAPED OR FORCED TO HAVE ANY KIND OF SEXUAL ACTIVITY BY YOUR PARTNER OR EX-PARTNER?: NO

## 2025-03-17 SDOH — ECONOMIC STABILITY: HOUSING INSECURITY: IN THE LAST 12 MONTHS, WAS THERE A TIME WHEN YOU WERE NOT ABLE TO PAY THE MORTGAGE OR RENT ON TIME?: NO

## 2025-03-17 SDOH — SOCIAL STABILITY: SOCIAL INSECURITY: WITHIN THE LAST YEAR, HAVE YOU BEEN HUMILIATED OR EMOTIONALLY ABUSED IN OTHER WAYS BY YOUR PARTNER OR EX-PARTNER?: NO

## 2025-03-17 SDOH — SOCIAL STABILITY: SOCIAL INSECURITY: ARE YOU MARRIED, WIDOWED, DIVORCED, SEPARATED, NEVER MARRIED, OR LIVING WITH A PARTNER?: MARRIED

## 2025-03-17 SDOH — ECONOMIC STABILITY: FOOD INSECURITY: HOW HARD IS IT FOR YOU TO PAY FOR THE VERY BASICS LIKE FOOD, HOUSING, MEDICAL CARE, AND HEATING?: NOT HARD AT ALL

## 2025-03-17 SDOH — HEALTH STABILITY: MENTAL HEALTH: HOW MANY DRINKS CONTAINING ALCOHOL DO YOU HAVE ON A TYPICAL DAY WHEN YOU ARE DRINKING?: PATIENT DOES NOT DRINK

## 2025-03-17 SDOH — ECONOMIC STABILITY: FOOD INSECURITY: WITHIN THE PAST 12 MONTHS, YOU WORRIED THAT YOUR FOOD WOULD RUN OUT BEFORE YOU GOT THE MONEY TO BUY MORE.: NEVER TRUE

## 2025-03-17 SDOH — SOCIAL STABILITY: SOCIAL NETWORK: HOW OFTEN DO YOU GET TOGETHER WITH FRIENDS OR RELATIVES?: ONCE A WEEK

## 2025-03-17 SDOH — ECONOMIC STABILITY: TRANSPORTATION INSECURITY: IN THE PAST 12 MONTHS, HAS LACK OF TRANSPORTATION KEPT YOU FROM MEDICAL APPOINTMENTS OR FROM GETTING MEDICATIONS?: NO

## 2025-03-17 ASSESSMENT — ACTIVITIES OF DAILY LIVING (ADL): LACK_OF_TRANSPORTATION: NO

## 2025-03-17 NOTE — CARE COORDINATION
constraints, and medication side effects  Plan for overcoming my barriers: continue to keep medications organized in daily pill keepers for daily administration  Confidence: 8/10  Anticipated Goal Completion Date: 6/17/25       Reduce Falls         I will reduce my risk of falls by the following: Remove rugs or use non slip rugs  Install grab bars in bathroom  Use walking aids like cane or walker  Follow through on orders for PT    Barriers: overwhelmed by complexity of regimen, stress, time constraints, and medication side effects  Plan for overcoming my barriers: keep falls precautions in place inside and outside of the home.   Confidence: 8/10  Anticipated Goal Completion Date: 6/17/25               PCP/Specialist follow up:   Future Appointments         Provider Specialty Dept Phone    6/11/2025 12:00 PM Constantino Watson MD Gastroenterology [phone]            Follow Up:   Plan for next ACM outreach in approximately 1 week to complete:  - CC Protocol assessments  - disease specific assessments  - medication review   - advance care planning   - goal progression  - education   - outreach attempt to offer care management services.   Patient  is agreeable to this plan.

## 2025-03-25 ENCOUNTER — CARE COORDINATION (OUTPATIENT)
Dept: CARE COORDINATION | Age: 70
End: 2025-03-25

## 2025-03-25 NOTE — CARE COORDINATION
Ambulatory Care Coordination Note     3/25/2025 9:52 AM     Patient Current Location:  Home: [address] [place] [id number]     ACM contacted the patient by telephone. Verified name and  with patient as identifiers.         ACM: Linda Pugh RN     Challenges to be reviewed by the provider   Additional needs identified to be addressed with provider No  none               Method of communication with provider: none.    Utilization: Patient has not had any utilization since our last call.     Care Summary Note:   ACM contacted Adam for care coordination follow up. Adam reports he still continues to have pain in his lower back. He reported he recently had a nerve block with no relief. He reports he will attend another appt with [facility]  for another consultation with Dr. Andrez Pardo. Adam reports he takes all of his medications as prescribed and attends all of his appts as scheduled. He denies any transportation issues. He denies any other issues or concerns at this time that need reported to his PCP.     Previous back xray:  X RAY LUMBAR SPINE RESULTS 24  IMPRESSION:  1. No acute findings.  2. Moderate facet arthropathy and minimal left convexity curvature of the  lower lumbar spine similar to the prior study.  3. Degenerative changes as described.     PCP referral previously:  Reason for Referral - Consult, Test, Treat (Routine) - Authorized  Specialty Diagnoses / Procedures Referred By Contact Referred To Contact   Neurology Diagnoses   Abnormality of gait      Procedures   CONSULT TO NEUROLOGY   OFFICE/OUTPATIENT [facility] 60 MINUTES  Spine Medicine   [address] [place] [address]   Phone: tel:[phone]   fax:[phone]         Other follow up appts scheduled:           2025 12:00 PM OFFICE VISIT [facility] Constantino James MD    Appointment Notes:   follow up GERD, IBS                 PLAN  Continue care coordination  HTN  Back pain/nerve block

## 2025-03-26 ENCOUNTER — CARE COORDINATION (OUTPATIENT)
Dept: CARE COORDINATION | Age: 70
End: 2025-03-26

## 2025-03-26 NOTE — CARE COORDINATION
TAY received a call from [facility] regarding an appt with [facility]. He has an appt 4/8/25 at 245 pm. Adam has been in a lot of pain and was attempting to have his appt changed to a sooner appt and asked for [facility] assistance. TAY made a 3 way call to Dr. Pardo office at [facility] and left a voice mail with information to return the call to [facility] for an earlier appt if possible.

## 2025-04-21 ENCOUNTER — CARE COORDINATION (OUTPATIENT)
Dept: CARE COORDINATION | Age: 70
End: 2025-04-21

## 2025-05-01 ENCOUNTER — CARE COORDINATION (OUTPATIENT)
Dept: CARE COORDINATION | Age: 70
End: 2025-05-01

## 2025-05-01 NOTE — CARE COORDINATION
the Remote Patient Monitoring (RPM) program for in-home monitoring: Deferred at this time because declined; will discuss at next outreach.     Assessments Completed:       3/17/2025     1:16 PM   Amb Fall Risk Assessment and TUG Test   Do you feel unsteady or are you worried about falling?  no   2 or more falls in past year? no   Fall with injury in past year? no    ,   Hypertension - Encounter Level    Symptoms:  (Comment: Denies at this time)  Symptom course: stable      ,   General Assessment    Do you have any symptoms that are causing concern?: No          Medications Reviewed:   Patient denies any changes with medications and reports taking all medications as prescribed.    Advance Care Planning:   Not reviewed during this call     Care Planning:   Education Documentation  Educate transfer safety, taught by Linda Pugh RN at 5/1/2025  3:49 PM.  Learner: Patient  Readiness: Acceptance  Method: Explanation, Teachback  Response: Verbalizes Understanding    Educate ambulation safety, taught by Linda Pugh RN at 5/1/2025  3:49 PM.  Learner: Patient  Readiness: Acceptance  Method: Explanation, Teachback  Response: Verbalizes Understanding    Medication Guideline, taught by Linda Pugh RN at 5/1/2025  3:49 PM.  Learner: Patient  Readiness: Acceptance  Method: Explanation, Teachback  Response: Verbalizes Understanding    Self Administered Medication Education , taught by Linda Pugh RN at 5/1/2025  3:49 PM.  Learner: Patient  Readiness: Acceptance  Method: Explanation, Teachback  Response: Verbalizes Understanding    General medication information, taught by Linda Pugh RN at 5/1/2025  3:49 PM.  Learner: Patient  Readiness: Acceptance  Method: Explanation, Teachback  Response: Verbalizes Understanding    Education Comments  No comments found.     ,    Goals Addressed                   This Visit's Progress     Medication Management   On track     I will take my medication as directed.  I will notify my

## 2025-05-04 ENCOUNTER — APPOINTMENT (OUTPATIENT)
Dept: CT IMAGING | Age: 70
End: 2025-05-04
Payer: MEDICARE

## 2025-05-04 ENCOUNTER — HOSPITAL ENCOUNTER (EMERGENCY)
Age: 70
Discharge: HOME | End: 2025-05-04
Attending: EMERGENCY MEDICINE
Payer: MEDICARE

## 2025-05-04 VITALS
BODY MASS INDEX: 29.18 KG/M2 | DIASTOLIC BLOOD PRESSURE: 103 MMHG | WEIGHT: 197 LBS | TEMPERATURE: 98.3 F | OXYGEN SATURATION: 100 % | SYSTOLIC BLOOD PRESSURE: 172 MMHG | HEIGHT: 69 IN | RESPIRATION RATE: 18 BRPM | HEART RATE: 67 BPM

## 2025-05-04 DIAGNOSIS — M54.50 DORSALGIA OF LUMBAR REGION: Primary | ICD-10-CM

## 2025-05-04 DIAGNOSIS — M54.16 LUMBAR RADICULOPATHY: ICD-10-CM

## 2025-05-04 PROCEDURE — 72131 CT LUMBAR SPINE W/O DYE: CPT

## 2025-05-04 PROCEDURE — 6360000002 HC RX W HCPCS

## 2025-05-04 PROCEDURE — 6370000000 HC RX 637 (ALT 250 FOR IP)

## 2025-05-04 PROCEDURE — 96372 THER/PROPH/DIAG INJ SC/IM: CPT

## 2025-05-04 PROCEDURE — 99284 EMERGENCY DEPT VISIT MOD MDM: CPT

## 2025-05-04 RX ORDER — LIDOCAINE 4 G/G
1 PATCH TOPICAL DAILY
Status: DISCONTINUED | OUTPATIENT
Start: 2025-05-04 | End: 2025-05-04 | Stop reason: HOSPADM

## 2025-05-04 RX ORDER — ORPHENADRINE CITRATE 30 MG/ML
60 INJECTION INTRAMUSCULAR; INTRAVENOUS ONCE
Status: COMPLETED | OUTPATIENT
Start: 2025-05-04 | End: 2025-05-04

## 2025-05-04 RX ADMIN — ORPHENADRINE CITRATE 60 MG: 60 INJECTION INTRAMUSCULAR; INTRAVENOUS at 15:57

## 2025-05-04 ASSESSMENT — PAIN DESCRIPTION - PAIN TYPE: TYPE: ACUTE PAIN

## 2025-05-04 ASSESSMENT — PAIN DESCRIPTION - LOCATION: LOCATION: BACK

## 2025-05-04 ASSESSMENT — PAIN DESCRIPTION - ORIENTATION: ORIENTATION: LOWER

## 2025-05-04 ASSESSMENT — PAIN - FUNCTIONAL ASSESSMENT
PAIN_FUNCTIONAL_ASSESSMENT: 0-10
PAIN_FUNCTIONAL_ASSESSMENT: ACTIVITIES ARE NOT PREVENTED

## 2025-05-04 ASSESSMENT — PAIN SCALES - GENERAL: PAINLEVEL_OUTOF10: 4

## 2025-05-04 ASSESSMENT — PAIN DESCRIPTION - DESCRIPTORS: DESCRIPTORS: ACHING

## 2025-05-04 ASSESSMENT — PAIN DESCRIPTION - ONSET: ONSET: ON-GOING

## 2025-05-04 ASSESSMENT — PAIN DESCRIPTION - FREQUENCY: FREQUENCY: CONTINUOUS

## 2025-05-04 NOTE — DISCHARGE INSTRUCTIONS
Please call and follow-up with family doctor as soon as possible.  See your orthopedic doctor this coming week as discussed.  Continue to take your blood pressure medications and return if you have numbness or weakness of any extremities or if you have worsening symptoms.

## 2025-05-04 NOTE — ED PROVIDER NOTES
[facility] EMERGENCY DEPARTMENT  EMERGENCY DEPARTMENT ENCOUNTER        Pt Name: Akshat Steward  MRN: [id number]  Birthdate 1955  Date of evaluation: 5/4/2025  Provider: Tanner Messina DO  PCP: Mark Gonzalez DO  Note Started: 3:17 PM EDT 5/4/25    CHIEF COMPLAINT       Chief Complaint   Patient presents with    Back Pain     left lower back radiates down left leg, says has been evaluated for it multiple times but \"can't take it anymore\" has appt with ortho Thursday for another epidural       HISTORY OF PRESENT ILLNESS: 1 or more Elements   History received from: Patient    Akshat Steward is a 69 y.o. male who presents to the emergency department with chief complaint of back pain.  Patient states that he has chronic back pain and has had it for quite a long time, being many months where it is always left-sided and radiates down the left leg.  States that at times will wake him from his sleep.  States he has known history of for spinal stenosis and is following up with orthopedic surgery this coming Thursday for an epidural injection.  States that the pain is not worse today but he wanted evaluated to make sure that his back is not getting any worse and see if there is any other medication he could take between now and Thursday for his pain.  He states that he has chronic paresthesias of left lower extremity and has not had any falls or injuries.  He has no midline tenderness.  He states he has not had a loss of control of bowels or bladder and has no numbness or weakness of lower extremities.  Did take Tylenol prior to coming in and states that it has helped his pain.  Denies any other symptoms including fevers, chills, nausea, vomiting, chest pain, shortness of breath, abdominal pain, hematuria or dysuria, constipation or diarrhea.    Nursing Notes were all reviewed and agreed with or any disagreements were addressed in the HPI.    REVIEW OF SYSTEMS :    Positives and Pertinent

## 2025-05-05 ENCOUNTER — CARE COORDINATION (OUTPATIENT)
Dept: CARE COORDINATION | Age: 70
End: 2025-05-05

## 2025-05-05 NOTE — CARE COORDINATION
ACM follow up with Adam as he was in ER 5/4/25. He was diagnosed with Dorsalgia of lumbar region. He reports he has been in extreme pain and continues.     Medications given in ER:  lidocaine Last given at 3:57 PM  orphenadrine (NORFLEX) Last given at  3:57 PM    Adam reports he is still in pain after medications given in ER.  He also had an increase in BP in ER at 172/103. He reports he had not taken his BP meds in 2 days. ACM educated to continue with all meds as prescribed. Adam reports to continuing his medications as prescribed today.      Adam reports he has his new PCP appt 5/23/25 with Dr. Coreas at [facility].       CT LUMBAR SPINE WO CONTRAST   IMPRESSION:  1. There is a right paracentral disc herniation at L3-L4 with vacuum disc  phenomena extending into the lateral recess.  2. There is moderate spinal stenosis at L4-L5.  3. There is mild spinal stenosis at L1-L2 and L3-L4.  4. There is a 13 degree dextroscoliosis centered from the superior endplate  of T12 to the inferior endplate of L4.    Most recent labs reviewed with Adam as requested.     ACM to continue to follow up with Adam for care coordination.

## 2025-05-07 ENCOUNTER — OFFICE VISIT (OUTPATIENT)
Dept: FAMILY MEDICINE CLINIC | Age: 70
End: 2025-05-07
Payer: MEDICARE

## 2025-05-07 VITALS
BODY MASS INDEX: 29.77 KG/M2 | OXYGEN SATURATION: 99 % | SYSTOLIC BLOOD PRESSURE: 148 MMHG | TEMPERATURE: 96.6 F | WEIGHT: 201 LBS | HEIGHT: 69 IN | DIASTOLIC BLOOD PRESSURE: 98 MMHG | HEART RATE: 97 BPM

## 2025-05-07 DIAGNOSIS — M54.50 CHRONIC BILATERAL LOW BACK PAIN WITHOUT SCIATICA: ICD-10-CM

## 2025-05-07 DIAGNOSIS — G89.29 CHRONIC BILATERAL LOW BACK PAIN WITHOUT SCIATICA: ICD-10-CM

## 2025-05-07 DIAGNOSIS — I10 PRIMARY HYPERTENSION: Primary | ICD-10-CM

## 2025-05-07 PROCEDURE — 99214 OFFICE O/P EST MOD 30 MIN: CPT | Performed by: PHYSICIAN ASSISTANT

## 2025-05-07 PROCEDURE — 3080F DIAST BP >= 90 MM HG: CPT | Performed by: PHYSICIAN ASSISTANT

## 2025-05-07 PROCEDURE — 1123F ACP DISCUSS/DSCN MKR DOCD: CPT | Performed by: PHYSICIAN ASSISTANT

## 2025-05-07 PROCEDURE — 3077F SYST BP >= 140 MM HG: CPT | Performed by: PHYSICIAN ASSISTANT

## 2025-05-07 PROCEDURE — 1159F MED LIST DOCD IN RCRD: CPT | Performed by: PHYSICIAN ASSISTANT

## 2025-05-07 PROCEDURE — 1160F RVW MEDS BY RX/DR IN RCRD: CPT | Performed by: PHYSICIAN ASSISTANT

## 2025-05-07 RX ORDER — VALSARTAN 160 MG/1
160 TABLET ORAL DAILY
COMMUNITY

## 2025-05-07 NOTE — PROGRESS NOTES
25  Akshat Steward : 1955 Sex: male  Age 69 y.o.      Subjective:  Chief Complaint   Patient presents with    Back Pain     DUE FOR EPIDURAL INJECTION TOMORROW          HPI:     History of Present Illness  The patient is a 69-year-old male who presents for evaluation of back pain and elevated blood pressure.    He has been diagnosed with spinal stenosis and is scheduled for an epidural injection tomorrow. He reports severe back pain, which was particularly intense this morning but has since slightly subsided. Prior to his diagnosis, he was managing his pain with ibuprofen but has been advised against its use. He is currently taking Tylenol Arthritis 650 mg, 2 tablets every 8 hours. He expresses concern about the potential impact of Tylenol on his blood pressure and whether it could be contributing to his elevated readings. He also questions if his pain could be a factor in his high blood pressure. He is seeking advice on how to manage his pain throughout the day. He reports no urinary or bowel incontinence, numbness in the groin area, dysuria, or fevers. However, he does experience tingling sensations down his left leg and occasional numbness in the same leg.    He has been experiencing elevated blood pressure readings, which have been monitored at home using his personal device. His readings were 200/130 both yesterday and this morning, causing him significant distress. He is concerned about the potential risk of kidney damage due to his high blood pressure, given his father's history of renal disease. He was initiated on valsartan 160 mg.    FAMILY HISTORY  His father had kidney issues.            ROS:   Unless otherwise stated in this report the patient's positive and negative responses for review of systems for constitutional, eyes, ENT, cardiovascular, respiratory, gastrointestinal, neurological, , musculoskeletal, and integument systems and related systems to the presenting problem are

## 2025-05-15 ENCOUNTER — CARE COORDINATION (OUTPATIENT)
Dept: CARE COORDINATION | Age: 70
End: 2025-05-15

## 2025-05-15 NOTE — CARE COORDINATION
TAY contacted Akshat for care coordination follow up. He reports he will call right back in his office.   
Explanation, Teachback  Response: Verbalizes Understanding    Education Comments  No comments found.     ,    Goals Addressed                   This Visit's Progress     Medication Management   On track     I will take my medication as directed.  I will notify my provider of any problems with medications, like adverse effects or side effects.  I will notify my provider/Care Coordinator if I am unable to afford my medications.  I will notify my provider for advice before I stop taking any of my medication.    Barriers: overwhelmed by complexity of regimen, stress, time constraints, and medication side effects  Plan for overcoming my barriers: continue to keep medications organized in daily pill keepers for daily administration  Confidence: 8/10  Anticipated Goal Completion Date: 6/17/25       Reduce Falls    On track     I will reduce my risk of falls by the following: Remove rugs or use non slip rugs  Install grab bars in bathroom  Use walking aids like cane or walker  Follow through on orders for PT    Barriers: overwhelmed by complexity of regimen, stress, time constraints, and medication side effects  Plan for overcoming my barriers: keep falls precautions in place inside and outside of the home.   Confidence: 8/10  Anticipated Goal Completion Date: 6/17/25               PCP/Specialist follow up:   Future Appointments         Provider Specialty Dept Phone    6/12/2025 12:00 PM Constantino Watson MD Gastroenterology [phone]            Follow Up:   Plan for next AC outreach in approximately 2 weeks to complete:  - CC Protocol assessments  - disease specific assessments  - medication review   - advance care planning   - goal progression  - education   - outreach attempt to offer care management services.   Patient  is agreeable to this plan.

## 2025-05-28 ENCOUNTER — CARE COORDINATION (OUTPATIENT)
Dept: CARE COORDINATION | Age: 70
End: 2025-05-28

## 2025-05-28 NOTE — CARE COORDINATION
Ambulatory Care Coordination Note     2025 10:38 AM     Patient Current Location:  Home: [address] [place] [id number]     ACM contacted the patient by telephone. Verified name and  with patient as identifiers.         ACM: Linda Pugh RN     Challenges to be reviewed by the provider   Additional needs identified to be addressed with provider No  none               Method of communication with provider: none.    Utilization: Patient has not had any utilization since our last call.     Care Summary Note:     ACM received a call from Adam today. He reports he continues to have low back pain. He had a new PCP appt yesterday and wanted to review labs. ACM reviewed and all WNL. Adam reports to taking all of his meds as prescribed. Appts reviewed and Adam reports to attending all of his appts as scheduled. He reports to attending the [facility] with an appt with Dr. Vasquez for Decompression of the spine. His appt is today at 130.    Adam denies any other issues to report to his PCP today.   PLAN  Continue care coordination  HTN  Low back pain  Sinal decompression  [facility] Dr. Christina Moon  Appts      Offered patient enrollment in the Remote Patient Monitoring (RPM) program for in-home monitoring: Deferred at this time because declined; will discuss at next outreach.     Assessments Completed:       3/17/2025     1:16 PM   Amb Fall Risk Assessment and TUG Test   Do you feel unsteady or are you worried about falling?  no   2 or more falls in past year? no   Fall with injury in past year? no    ,   Hypertension - Encounter Level    Symptom course: stable      ,   General Assessment              Medications Reviewed:   Patient denies any changes with medications and reports taking all medications as prescribed. Reviewed complete med list today.     Advance Care Planning:   Reviewed and current     Care Planning:   Education Documentation  Educate transfer safety, taught by Linda Pugh RN at

## 2025-06-13 ENCOUNTER — CARE COORDINATION (OUTPATIENT)
Dept: CARE COORDINATION | Age: 70
End: 2025-06-13

## 2025-06-13 NOTE — CARE COORDINATION
Ambulatory Care Coordination Note     2025 3:21 PM     Patient Current Location:  Home: [address] [place] [id number]     ACM contacted the patient by telephone. Verified name and  with patient as identifiers.     Patient graduated from the High Risk Care Management program on 2025.  Patient verbalizes confidence in the ability to self-manage at this time. has the ability to self-manage at this time..  Care management goals have been completed. No further Ambulatory Care Manager follow up scheduled.      ACM: Linda Pugh RN     Challenges to be reviewed by the provider   Additional needs identified to be addressed with provider No  Follows PCP for back pain and the [facility]               Method of communication with provider: none.    Utilization: Patient has not had any utilization since our last call.     Care Summary Note:   AC received a return call from Adam for care coordination follow up. Adam reports he still continues to have severe back pain daily. He reports he follows with the [facility] and his Chiropractor Dr. Vasquez for spinal decompression. He reports to having 4 spinal decompressions and he is in more pain than before. He reports he now was prescribed Gabapentin 100 mg 3 times a day ([facility]) but only takes twice a day. He reported mild dizziness and nausea this morning from the medications. He reports he was told by 3 facilities no surgery is needed yet. ACM educated to continue with all medications as prescribed. Adam reports he continues to take his meds daily. ACM edcuated to follow PCP advice and start PT therapy also that was ordered. Adam is hesitated as he reports it did not help prior.     Adam reports he attends all of his appts as scheduled and denies any issues with transportation at this time.     ACM to graduate Adam today as he is not PHP patient. AC supplied contact information shall Adam have any additional questions.     PLAN   Graduate from Care 
Attempted to reach patient by telephone. Left HIPAA compliant message requesting a return call. Will attempt to reach patient again for care coordination follow up and to graduate patient as non PHP.    
(M6) obeys commands

## 2025-06-17 ENCOUNTER — CARE COORDINATION (OUTPATIENT)
Dept: CARE COORDINATION | Age: 70
End: 2025-06-17

## 2025-06-17 NOTE — CARE COORDINATION
TAY received a return call from Adam. Adam received the contact information and is calling [facility] in [place] for information about therapy.

## 2025-06-17 NOTE — CARE COORDINATION
ACM updated Adam with PT information as requested. [facility] in [place] [phone] as he had mentioned PT consult with his PC for his back issues.

## 2025-06-24 ENCOUNTER — TELEPHONE (OUTPATIENT)
Dept: PRIMARY CARE CLINIC | Age: 70
End: 2025-06-24

## 2025-06-24 NOTE — TELEPHONE ENCOUNTER
Adam was called to schedule an appointment he wanted to call and let the dr know he is going back to his old dr and no longer a patient of dr rogel.

## 2025-06-24 NOTE — TELEPHONE ENCOUNTER
----- Message from Dr. Mark Gonzalez, DO sent at 6/23/2025  1:56 PM EDT -----  Needs htn appt.    Best regards,  Mark Gonzalez

## 2025-06-30 RX ORDER — IBUPROFEN 800 MG/1
TABLET, FILM COATED ORAL
Qty: 42 TABLET | Refills: 0 | OUTPATIENT
Start: 2025-06-30

## 2025-07-23 ENCOUNTER — OFFICE VISIT (OUTPATIENT)
Dept: FAMILY MEDICINE CLINIC | Age: 70
End: 2025-07-23
Payer: MEDICARE

## 2025-07-23 VITALS
RESPIRATION RATE: 18 BRPM | TEMPERATURE: 97.6 F | DIASTOLIC BLOOD PRESSURE: 80 MMHG | WEIGHT: 204 LBS | BODY MASS INDEX: 30.21 KG/M2 | HEART RATE: 77 BPM | SYSTOLIC BLOOD PRESSURE: 132 MMHG | HEIGHT: 69 IN | OXYGEN SATURATION: 99 %

## 2025-07-23 DIAGNOSIS — M25.512 ACUTE PAIN OF LEFT SHOULDER: Primary | ICD-10-CM

## 2025-07-23 DIAGNOSIS — R07.81 RIB PAIN ON LEFT SIDE: ICD-10-CM

## 2025-07-23 DIAGNOSIS — M25.532 LEFT WRIST PAIN: ICD-10-CM

## 2025-07-23 PROCEDURE — 99214 OFFICE O/P EST MOD 30 MIN: CPT | Performed by: PHYSICIAN ASSISTANT

## 2025-07-23 PROCEDURE — 3075F SYST BP GE 130 - 139MM HG: CPT | Performed by: PHYSICIAN ASSISTANT

## 2025-07-23 PROCEDURE — 1123F ACP DISCUSS/DSCN MKR DOCD: CPT | Performed by: PHYSICIAN ASSISTANT

## 2025-07-23 PROCEDURE — 3079F DIAST BP 80-89 MM HG: CPT | Performed by: PHYSICIAN ASSISTANT

## 2025-07-23 PROCEDURE — 1159F MED LIST DOCD IN RCRD: CPT | Performed by: PHYSICIAN ASSISTANT

## 2025-07-23 NOTE — PROGRESS NOTES
pertinent negatives as per HPI.  All others reviewed and are negative.      PMH:     Past Medical History:   Diagnosis Date    Anxiety     GERD (gastroesophageal reflux disease)     Gout     Hyperlipidemia     Irritable bowel syndrome        Past Surgical History:   Procedure Laterality Date    SHOULDER SURGERY Right     shoulder replacment    TONSILLECTOMY         Family History   Problem Relation Age of Onset    Heart Disease Mother     Dementia Mother     Heart Disease Father         heart valve replaced    Kidney Disease Father     No Known Problems Sister     No Known Problems Brother     Bone Cancer Maternal Grandfather        Medications:     Current Outpatient Medications   Medication Sig Dispense Refill    valsartan (DIOVAN) 160 MG tablet Take 1 tablet by mouth daily      febuxostat (ULORIC) 40 MG TABS tablet Take 1 tablet by mouth daily 30 tablet 5     No current facility-administered medications for this visit.        Allergies:     Allergies   Allergen Reactions    Gadolinium Angioedema       Social History:     Social History     Tobacco Use    Smoking status: Never    Smokeless tobacco: Never   Vaping Use    Vaping status: Never Used   Substance Use Topics    Alcohol use: Yes     Comment: rare    Drug use: No       Patient lives at home.    Physical Exam:     Vitals:    07/23/25 1145   BP: 132/80   Pulse: 77   Resp: 18   Temp: 97.6 °F (36.4 °C)   TempSrc: Temporal   SpO2: 99%   Weight: 92.5 kg (204 lb)   Height: 1.753 m (5' 9\")       Exam:  Physical Exam  Nurses note and vital signs reviewed and patient is not hypoxic.    General: The patient appears well and in no apparent distress. Patient is resting comfortably on cart.  Skin: Warm, dry, no pallor noted. There is no rash noted.  Head: Normocephalic, atraumatic.  Eye: Normal conjunctiva  Ears, Nose, Mouth, and Throat: Oral mucosa is moist  Cardiovascular: Regular Rate and Rhythm  Respiratory: Patient is in no distress, no accessory muscle use, lungs